# Patient Record
Sex: FEMALE | Race: WHITE | NOT HISPANIC OR LATINO | Employment: FULL TIME | URBAN - METROPOLITAN AREA
[De-identification: names, ages, dates, MRNs, and addresses within clinical notes are randomized per-mention and may not be internally consistent; named-entity substitution may affect disease eponyms.]

---

## 2021-01-06 ENCOUNTER — TELEPHONE (OUTPATIENT)
Dept: PSYCHIATRY | Facility: CLINIC | Age: 26
End: 2021-01-06

## 2021-01-06 NOTE — TELEPHONE ENCOUNTER
Behavorial Health Outpatient Intake Questions    Referred by: Insurance    Please advised interviewee that they need to answer all questions truthfully to allow for best care and any misrepresentations of information may affect their ability to be seen at this clinic   => Was this discussed? Yes     Behavorial Health Outpatient Intake History -     Presenting Problem (in patient's words): Patient is diagnosed with SIDDHARTH and Panic Disorder, she has seeing a psychiatrist since she was 7  She is currently taking Prozac, 40mg a day  Patient's Insurance changed and her current psychiatrist does not take it, she is looking to transition her care for medication management  Patient will run out of medication at the end of February  Are there any developmental disabilities? ? If yes, can they speak to you on the phone? If they are too limited to speak to you on phone, refer out No    Are you taking any psychiatric medications? Yes    => If yes, who prescribes? If yes, are they injectable medications? [facility] in [facility]     Does the patient have a language barrier or hearing impairment? No    Have you been treated at [facility] by a therapist or a doctor in the past? If yes, who? No    Has the patient been hospitalized for mental health? No   If yes, how long ago was last hospitalization and where was it? Do you actively use alcohol or marijuana or illegal substances? If yes, what and how much - refer out to Drug and alcohol treatment if use is excessive or daily use of illegal substances No concerns of substance abuse are reported  Do you have a community treatment team or ? No    Legal History-     Does the patient have any history of arrests, retirement/[facility] time, or DUIs? No  If Yes-  1) What types of charges? 2) When were they last incarcerated? 3) Are they currently on parole or probation? Minor Child-    Who has custody of the child? Is there a custody agreement?      If there is a custody agreement remind parent that they must bring a copy to the first appt or they will not be seen  Intake Team, please check with provider before scheduling if flags come up such as:  - complex case  - legal history (other than DUI)  - communication barrier concerns are present  - if, in your judgment, this needs further review    ACCEPTED as a patient Yes  => Appointment Date: Tuesday, 1/26 at 1:00pm with Maribel Katherin    Referred Elsewhere? No    Name of Insurance Co: [id number] ID# [id number]  Insurance Phone # [phone]   If ins is primary or secondary  If patient is a minor, parents information such as Name, D  O B of guarantor

## 2021-01-12 ENCOUNTER — TELEMEDICINE (OUTPATIENT)
Dept: PSYCHIATRY | Facility: CLINIC | Age: 26
End: 2021-01-12
Payer: COMMERCIAL

## 2021-01-12 DIAGNOSIS — F33.1 MAJOR DEPRESSIVE DISORDER, RECURRENT, MODERATE (HCC): ICD-10-CM

## 2021-01-12 DIAGNOSIS — F41.0 PANIC DISORDER: ICD-10-CM

## 2021-01-12 DIAGNOSIS — F41.1 GENERALIZED ANXIETY DISORDER: Primary | ICD-10-CM

## 2021-01-12 DIAGNOSIS — F40.298 SPECIFIC PHOBIA: ICD-10-CM

## 2021-01-12 PROCEDURE — 99244 OFF/OP CNSLTJ NEW/EST MOD 40: CPT | Performed by: PSYCHIATRY & NEUROLOGY

## 2021-01-12 RX ORDER — PAROXETINE HYDROCHLORIDE 40 MG/1
20 TABLET, FILM COATED ORAL EVERY MORNING
Qty: 30 TABLET | Refills: 1 | Status: SHIPPED | OUTPATIENT
Start: 2021-01-12 | End: 2021-02-23

## 2021-01-12 RX ORDER — HYDROXYZINE HYDROCHLORIDE 25 MG/1
TABLET, FILM COATED ORAL
COMMUNITY
Start: 2020-12-07 | End: 2021-01-12 | Stop reason: SDUPTHER

## 2021-01-12 RX ORDER — HYDROXYZINE HYDROCHLORIDE 25 MG/1
25 TABLET, FILM COATED ORAL EVERY 6 HOURS PRN
Qty: 120 TABLET | Refills: 1 | Status: SHIPPED | OUTPATIENT
Start: 2021-01-12 | End: 2021-05-25 | Stop reason: SDUPTHER

## 2021-01-12 RX ORDER — FLUOXETINE HYDROCHLORIDE 40 MG/1
40 CAPSULE ORAL DAILY
COMMUNITY
Start: 2020-12-21 | End: 2021-01-12

## 2021-01-12 RX ORDER — NORGESTREL AND ETHINYL ESTRADIOL 0.3-0.03MG
KIT ORAL
COMMUNITY
Start: 2020-11-20
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
## 2021-01-12 NOTE — PATIENT INSTRUCTIONS
Generalized anxiety disorder  -     PARoxetine (PAXIL) 40 MG tablet; Take 0 5 tablets (20 mg total) by mouth every morning For 2 weeks and then increase to 40mg    Major depressive disorder, recurrent, moderate (HCC)  -     PARoxetine (PAXIL) 40 MG tablet; Take 0 5 tablets (20 mg total) by mouth every morning For 2 weeks and then increase to 40mg    Panic disorder  -     hydrOXYzine HCL (ATARAX) 25 mg tablet; Take 1 tablet (25 mg total) by mouth every 6 (six) hours as needed for anxiety    Specific phobia  -     hydrOXYzine HCL (ATARAX) 25 mg tablet; Take 1 tablet (25 mg total) by mouth every 6 (six) hours as needed for anxiety    Other orders  -     Discontinue: FLUoxetine (PROzac) 40 MG capsule;  Take 40 mg by mouth daily  -     Discontinue: hydrOXYzine HCL (ATARAX) 25 mg tablet; TK 1 T PO Q 6 H PRN  -     Low-Ogestrel 0 3-30 MG-MCG per tablet; TK 1 T PO D        Follow up in 2 months  Continue with own therapist

## 2021-01-12 NOTE — BH TREATMENT PLAN
TREATMENT PLAN (Medication Management Only)        [facility]    Name and Date of Birth:  Ling Schaefer 26 y o  1995  Date of Treatment Plan: January 12, 2021  Diagnosis/Diagnoses:    1  Generalized anxiety disorder    2  Major depressive disorder, recurrent, moderate (HCC)    3  Panic disorder    4  Specific phobia      Strengths/Personal Resources for Self-Care: "I am very empathetic  I am good at reading people  I have a lot musical abilities  I like to sing  ", supportive family, taking medications as prescribed  Area/Areas of need (in own words): "focusing on me and making sure I am my best self before jumping into another relationship  be less impulsive "  1  Long Term Goal: improve acceptable anxiety level  Exercise more  Target Date:6 months - 7/12/2021  Person/Persons responsible for completion of goal: Katie Dela Cruz, Dr Guillermina Mosley  2  Short Term Objective (s) - How will we reach this goal?:   A  Provider new recommended medication/dosage changes and/or continue medication(s): Medication changes: I have discontinued Riya Hood's FLUoxetine  I have also changed her hydrOXYzine HCL  Additionally, I am having her start on PARoxetine  Lastly, I am having her maintain her Low-Ogestrel     B  N/A   C  N/A  Target Date:2 months - 7/12/21  Person/Persons Responsible for Completion of Goal: Dr Jac Argueta  Progress Towards Goals: starting treatment  Treatment Modality: medication management every 2 months, continue psychotherapy with own therapist  Review due 180 days from date of this plan: 6 months - 7/12/2021  Expected length of service: ongoing treatment  My Physician/PA/NP and I have developed this plan together and I agree to work on the goals and objectives  I understand the treatment goals that were developed for my treatment      Treatment Plan done but not signed at time of office visit due to:  Plan reviewed by phone or in person  and verbal consent given due to [address] distancing

## 2021-02-23 ENCOUNTER — OFFICE VISIT (OUTPATIENT)
Dept: PSYCHIATRY | Facility: CLINIC | Age: 26
End: 2021-02-23
Payer: COMMERCIAL

## 2021-02-23 DIAGNOSIS — F41.0 PANIC DISORDER: ICD-10-CM

## 2021-02-23 DIAGNOSIS — F33.1 MAJOR DEPRESSIVE DISORDER, RECURRENT, MODERATE (HCC): ICD-10-CM

## 2021-02-23 DIAGNOSIS — F40.298 SPECIFIC PHOBIA: ICD-10-CM

## 2021-02-23 DIAGNOSIS — F41.1 GENERALIZED ANXIETY DISORDER: Primary | ICD-10-CM

## 2021-02-23 PROCEDURE — 99213 OFFICE O/P EST LOW 20 MIN: CPT | Performed by: PSYCHIATRY & NEUROLOGY

## 2021-02-23 RX ORDER — FLUOXETINE HYDROCHLORIDE 40 MG/1
40 CAPSULE ORAL DAILY
Qty: 90 CAPSULE | Refills: 1 | Status: SHIPPED | OUTPATIENT
Start: 2021-02-23 | End: 2021-05-25

## 2021-02-23 RX ORDER — FLUOXETINE HYDROCHLORIDE 40 MG/1
40 CAPSULE ORAL DAILY
COMMUNITY
Start: 2021-01-20 | End: 2021-02-23 | Stop reason: SDUPTHER

## 2021-02-23 NOTE — PATIENT INSTRUCTIONS
Generalized anxiety disorder  -     FLUoxetine (PROzac) 40 MG capsule; Take 1 capsule (40 mg total) by mouth daily    Panic disorder  -     FLUoxetine (PROzac) 40 MG capsule; Take 1 capsule (40 mg total) by mouth daily    Specific phobia  -     FLUoxetine (PROzac) 40 MG capsule; Take 1 capsule (40 mg total) by mouth daily    Major depressive disorder, recurrent, moderate (HCC)  -     FLUoxetine (PROzac) 40 MG capsule; Take 1 capsule (40 mg total) by mouth daily    Other orders  -     Discontinue: FLUoxetine (PROzac) 40 MG capsule;  Take 40 mg by mouth daily        Continue atarax as needed    Follow up in 3 months  Continue therapy
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
## 2021-05-25 ENCOUNTER — OFFICE VISIT (OUTPATIENT)
Dept: PSYCHIATRY | Facility: CLINIC | Age: 26
End: 2021-05-25
Payer: COMMERCIAL

## 2021-05-25 DIAGNOSIS — F41.1 GENERALIZED ANXIETY DISORDER: Primary | ICD-10-CM

## 2021-05-25 DIAGNOSIS — F40.298 SPECIFIC PHOBIA: ICD-10-CM

## 2021-05-25 DIAGNOSIS — F41.0 PANIC DISORDER: ICD-10-CM

## 2021-05-25 DIAGNOSIS — F33.1 MAJOR DEPRESSIVE DISORDER, RECURRENT, MODERATE (HCC): ICD-10-CM

## 2021-05-25 PROCEDURE — 99214 OFFICE O/P EST MOD 30 MIN: CPT | Performed by: PSYCHIATRY & NEUROLOGY

## 2021-05-25 PROCEDURE — 90833 PSYTX W PT W E/M 30 MIN: CPT | Performed by: PSYCHIATRY & NEUROLOGY

## 2021-05-25 RX ORDER — HYDROXYZINE HYDROCHLORIDE 25 MG/1
25 TABLET, FILM COATED ORAL EVERY 6 HOURS PRN
Qty: 120 TABLET | Refills: 1 | Status: SHIPPED | OUTPATIENT
Start: 2021-05-25

## 2021-05-25 RX ORDER — FLUOXETINE HYDROCHLORIDE 60 MG/1
60 TABLET, FILM COATED ORAL; ORAL DAILY
Qty: 30 TABLET | Refills: 1 | Status: SHIPPED | OUTPATIENT
Start: 2021-05-25 | End: 2021-07-13 | Stop reason: SDUPTHER
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
## 2021-05-25 NOTE — PATIENT INSTRUCTIONS
Generalized anxiety disorder  -     FLUoxetine (PROzac) 60 MG TABS; Take 1 tablet (60 mg total) by mouth daily    Panic disorder  -     FLUoxetine (PROzac) 60 MG TABS; Take 1 tablet (60 mg total) by mouth daily  -     hydrOXYzine HCL (ATARAX) 25 mg tablet; Take 1 tablet (25 mg total) by mouth every 6 (six) hours as needed for anxiety    Specific phobia  -     FLUoxetine (PROzac) 60 MG TABS; Take 1 tablet (60 mg total) by mouth daily  -     hydrOXYzine HCL (ATARAX) 25 mg tablet; Take 1 tablet (25 mg total) by mouth every 6 (six) hours as needed for anxiety    Major depressive disorder, recurrent, moderate (HCC)  -     FLUoxetine (PROzac) 60 MG TABS; Take 1 tablet (60 mg total) by mouth daily        Continue atarax as needed    Follow up in 7 weeks  Continue therapy in private practice  Practice grounding self in panic attack

## 2021-07-13 ENCOUNTER — OFFICE VISIT (OUTPATIENT)
Dept: PSYCHIATRY | Facility: CLINIC | Age: 26
End: 2021-07-13
Payer: COMMERCIAL

## 2021-07-13 DIAGNOSIS — F41.0 PANIC DISORDER: ICD-10-CM

## 2021-07-13 DIAGNOSIS — F33.1 MAJOR DEPRESSIVE DISORDER, RECURRENT, MODERATE (HCC): ICD-10-CM

## 2021-07-13 DIAGNOSIS — F40.298 SPECIFIC PHOBIA: ICD-10-CM

## 2021-07-13 DIAGNOSIS — F41.1 GENERALIZED ANXIETY DISORDER: Primary | ICD-10-CM

## 2021-07-13 PROCEDURE — 99213 OFFICE O/P EST LOW 20 MIN: CPT | Performed by: PSYCHIATRY & NEUROLOGY

## 2021-07-13 RX ORDER — FLUOXETINE HYDROCHLORIDE 60 MG/1
60 TABLET, FILM COATED ORAL; ORAL DAILY
Qty: 90 TABLET | Refills: 1 | Status: SHIPPED | OUTPATIENT
Start: 2021-07-13 | End: 2022-02-01
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
## 2021-07-13 NOTE — BH TREATMENT PLAN
TREATMENT PLAN (Medication Management Only)        [employer]    Name and Date of Birth:  Luis Hylton 26 y o  1995  Date of Treatment Plan: July 13, 2021  Diagnosis/Diagnoses:    1  Generalized anxiety disorder    2  Panic disorder    3  Specific phobia    4  Major depressive disorder, recurrent, moderate (HCC)      Strengths/Personal Resources for Self-Care: "I am good at singing  I am a good friend and listener  I am a hard working  I am very empathetic  I am proactive and insightful "  Area/Areas of need (in own words): "keep practicing willful tolerance "  1  Long Term Goal: to be settled into a new job  being able to recognized first symptoms of panic attack and get that undercontrol     Target Date:6 months - 1/13/2022  Person/Persons responsible for completion of goal: Dr Beto Akins  2  Short Term Objective (s) - How will we reach this goal?:   A  Provider new recommended medication/dosage changes and/or continue medication(s): Medication changes: I am having Luis Hylton maintain her Low-Ogestrel, hydrOXYzine HCL, and FLUoxetine     B  N/A   C  N/A  Target Date:6 months - 1/13/2022  Person/Persons Responsible for Completion of Goal: Dr Beto Akins  Progress Towards Goals: continuing treatment  Treatment Modality: medication management every 6 months  Review due 180 days from date of this plan: 6 months - 1/13/2022  Expected length of service: ongoing treatment  My Physician/PA/NP and I have developed this plan together and I agree to work on the goals and objectives  I understand the treatment goals that were developed for my treatment    Treatment Plan done but not signed at time of office visit due to:  Plan reviewed by phone or in person  and verbal consent given due to Josh social nafisa

## 2021-07-13 NOTE — PATIENT INSTRUCTIONS
Generalized anxiety disorder    Panic disorder    Specific phobia    Major depressive disorder, recurrent, moderate (HCC)        Continue atarax as needed    Follow up in 6 months  Continue therapy in private practice  Practice grounding self in panic attack

## 2022-02-01 ENCOUNTER — OFFICE VISIT (OUTPATIENT)
Dept: PSYCHIATRY | Facility: CLINIC | Age: 27
End: 2022-02-01
Payer: COMMERCIAL

## 2022-02-01 DIAGNOSIS — F40.298 SPECIFIC PHOBIA: ICD-10-CM

## 2022-02-01 DIAGNOSIS — F33.1 MAJOR DEPRESSIVE DISORDER, RECURRENT, MODERATE (HCC): Primary | ICD-10-CM

## 2022-02-01 DIAGNOSIS — F41.0 PANIC DISORDER: ICD-10-CM

## 2022-02-01 DIAGNOSIS — F41.1 GENERALIZED ANXIETY DISORDER: ICD-10-CM

## 2022-02-01 PROCEDURE — 99214 OFFICE O/P EST MOD 30 MIN: CPT | Performed by: PSYCHIATRY & NEUROLOGY

## 2022-02-01 PROCEDURE — 90833 PSYTX W PT W E/M 30 MIN: CPT | Performed by: PSYCHIATRY & NEUROLOGY

## 2022-02-01 RX ORDER — FLUOXETINE 10 MG/1
10 CAPSULE ORAL DAILY
Qty: 14 CAPSULE | Refills: 0 | Status: SHIPPED | OUTPATIENT
Start: 2022-02-01 | End: 2022-07-07 | Stop reason: SDUPTHER

## 2022-02-01 RX ORDER — FLUOXETINE HYDROCHLORIDE 40 MG/1
40 CAPSULE ORAL DAILY
Qty: 90 CAPSULE | Refills: 2 | Status: SHIPPED | OUTPATIENT
Start: 2022-02-01 | End: 2022-07-07

## 2022-02-01 NOTE — PSYCH
Subjective:     Patient ID: Quan Barrios is a 23 y o  female with MDD, anxiety, and panic disorder being seen for a follow up  She is currently on Prozac (she did not want to change to paxil) and Atarax as needed  HPI ROS Appetite Changes and Sleep: the patient has been doing well  She has been feeling good on her medication and is compliant  She has been sleeping a lot recently  She believes this is due to the  Dose being too high  She has been sleeping for about 10 hours+ and feels this has happened when her dose has been too high  She has been feeling this sleepiness for a few months now  She also is having "cloudy headaches" which started recently, but has happened in the past with high doses of meds  She denied feeling depressed  She denied SI/HI  She denied other side effects, hospitalizations, or drug/alcohol use  She denied being pregnant  Appetite is good, no changes in weight       Review Of Systems:     Mood Depression and anxiety  controlled    Behavior Normal    Thought Content Normal   General Sleep Disturbances and lack of energy   Personality Normal   Other Psych Symptoms Normal   Constitutional Negative   ENT COVID early January   Cardiovascular Negative   Respiratory Negative   Gastrointestinal Negative   Genitourinary Negative   Musculoskeletal Negative   Integumentary Negative   Neurological As Noted in HPI   Endocrine Normal    Other Symptoms Normal              Laboratory Results: none to review    Substance Abuse History:  Social History     Substance and Sexual Activity   Drug Use Never       Family Psychiatric History:   Family History   Problem Relation Age of Onset    Personality disorder Sister     Anxiety disorder Maternal Aunt     Bipolar disorder Maternal Aunt     Depression Maternal Aunt     Drug abuse Maternal Aunt     Alcohol abuse Paternal Grandfather     Depression Paternal Grandfather     OCD Maternal Aunt        The following portions of the patient's history were reviewed and updated as appropriate: allergies, current medications, past family history, past medical history, past social history, past surgical history and problem list     Social History     Socioeconomic History    Marital status: Single     Spouse name: Not on file    Number of children: 0    Years of education: 12    Highest education level: Bachelor's degree (e g , BA, AB, BS)   Occupational History    Occupation:      Comment: [employer]   Tobacco Use    Smoking status: Never Smoker    Smokeless tobacco: Never Used   Vaping Use    Vaping Use: Never used   Substance and Sexual Activity    Alcohol use:  Yes    Drug use: Never    Sexual activity: Yes     Partners: Male     Birth control/protection: OCP   Other Topics Concern    Not on file   Social History Narrative    Not on file     Social Determinants of Health     Financial Resource Strain: Not on file   Food Insecurity: Not on file   Transportation Needs: Not on file   Physical Activity: Not on file   Stress: Not on file   Social Connections: Not on file   Intimate Partner Violence: Not on file   Housing Stability: Not on file     Social History     Social History Narrative    Not on file       Objective:       Mental status:  Appearance calm and cooperative , adequate hygiene and grooming and good eye contact    Mood euthymic   Affect affect was broad   Speech a normal rate   Thought Processes coherent/organized and normal thought processes   Hallucinations no hallucinations present    Thought Content no delusions   Abnormal Thoughts no suicidal thoughts  and no homicidal thoughts     Orientation  oriented to person and place and time   Remote Memory short term memory intact and long term memory intact   Attention Span concentration intact   Intellect Appears to be of Average Intelligence   Insight Insight intact   Judgement judgment was intact   Muscle Strength Muscle strength and tone were normal and Normal gait    Language no difficulty naming common objects   Fund of Knowledge displays adequate knowledge of current events and adequate fund of knowledge regarding past history   Pain none   Pain Scale 0       Assessment/Plan:       Diagnoses and all orders for this visit:    Major depressive disorder, recurrent, moderate (HCC)  -     FLUoxetine (PROzac) 40 MG capsule; Take 1 capsule (40 mg total) by mouth daily With 10mg together for 2 weeks, then decrease to 40mg  -     FLUoxetine (PROzac) 10 mg capsule; Take 1 capsule (10 mg total) by mouth daily With 40mg together for 2 weeks, then decrease to 40mg    Generalized anxiety disorder  -     FLUoxetine (PROzac) 10 mg capsule; Take 1 capsule (10 mg total) by mouth daily With 40mg together for 2 weeks, then decrease to 40mg    Panic disorder  -     FLUoxetine (PROzac) 10 mg capsule; Take 1 capsule (10 mg total) by mouth daily With 40mg together for 2 weeks, then decrease to 40mg    Specific phobia  -     FLUoxetine (PROzac) 10 mg capsule; Take 1 capsule (10 mg total) by mouth daily With 40mg together for 2 weeks, then decrease to 40mg        Continue atarax as needed  Use 10,000 light for 30 mins in the morning    Follow up in 6 weeks  Continue therapy in private practice  Practice grounding self in panic attack  Treatment Recommendations- Risks Benefits      Immediate Medical/Psychiatric/Psychotherapy Treatments and Any Precautions: She has not been depressed or anxious, but in the last three months has been feeling more sleepy and has trouble with energy  This may be medication related as this was the case in the past  Will taper down to 40mg of prozac as per above       Risks, Benefits And Possible Side Effects Of Medications:  {PSYCH RISK, BENEFITS AND POSSIBLE SIDE EFFECTS discussed    Controlled Medication Discussion: No records found for controlled prescriptions according to Francia Doherty 17         This note was not shared with the patient due to reasonable likelihood of causing patient harm

## 2022-02-01 NOTE — PATIENT INSTRUCTIONS
Major depressive disorder, recurrent, moderate (HCC)  -     FLUoxetine (PROzac) 40 MG capsule; Take 1 capsule (40 mg total) by mouth daily With 10mg together for 2 weeks, then decrease to 40mg  -     FLUoxetine (PROzac) 10 mg capsule; Take 1 capsule (10 mg total) by mouth daily With 40mg together for 2 weeks, then decrease to 40mg    Generalized anxiety disorder  -     FLUoxetine (PROzac) 10 mg capsule; Take 1 capsule (10 mg total) by mouth daily With 40mg together for 2 weeks, then decrease to 40mg    Panic disorder  -     FLUoxetine (PROzac) 10 mg capsule; Take 1 capsule (10 mg total) by mouth daily With 40mg together for 2 weeks, then decrease to 40mg    Specific phobia  -     FLUoxetine (PROzac) 10 mg capsule; Take 1 capsule (10 mg total) by mouth daily With 40mg together for 2 weeks, then decrease to 40mg        Continue atarax as needed  Use 10,000 light for 30 mins in the morning    Follow up in 6 weeks  Continue therapy in private practice  Practice grounding self in panic attack

## 2022-02-01 NOTE — BH TREATMENT PLAN
TREATMENT PLAN (Medication Management Only)        [facility]    Name and Date of Birth:  Charly Florez 23 y o  1995  Date of Treatment Plan: February 1, 2022  Diagnosis/Diagnoses:    1  Major depressive disorder, recurrent, moderate (HCC)    2  Generalized anxiety disorder    3  Panic disorder    4  Specific phobia      Strengths/Personal Resources for Self-Care: "Im good at singing, I am a good listener, and very empathetic "  Area/Areas of need (in own words): "internalizing minor things", sleep problems, lack of energy  1  Long Term Goal: 20 lbs weight loss   Target Date:6 months - 8/1/2022  Person/Persons responsible for completion of goal: Donovan Chauhan, Dr Rafiq Morel  2  Short Term Objective (s) - How will we reach this goal?:   A  Provider new recommended medication/dosage changes and/or continue medication(s): Medication changes: I have discontinued Riya Hood's FLUoxetine  I am also having her start on FLUoxetine and FLUoxetine  Additionally, I am having her maintain her Low-Ogestrel and hydrOXYzine HCL     B  N/A   C  N/A  Target Date:6 months - 8/1/2022  Person/Persons Responsible for Completion of Goal: Dr Rafiq Coulter  Progress Towards Goals: continuing treatment  Treatment Modality: medication management every 6 weeks, continue psychotherapy with own therapist  Review due 180 days from date of this plan: 6 months - 8/1/2022  Expected length of service: ongoing treatment  My Physician/PA/NP and I have developed this plan together and I agree to work on the goals and objectives  I understand the treatment goals that were developed for my treatment        Treatment Plan done but not signed at time of office visit due to:  Plan reviewed by phone or in person  and verbal consent given due to Gwyn Hernandez social nafisa

## 2022-04-05 ENCOUNTER — TELEMEDICINE (OUTPATIENT)
Dept: PSYCHIATRY | Facility: CLINIC | Age: 27
End: 2022-04-05
Payer: COMMERCIAL

## 2022-04-05 DIAGNOSIS — F33.1 MAJOR DEPRESSIVE DISORDER, RECURRENT, MODERATE (HCC): ICD-10-CM

## 2022-04-05 DIAGNOSIS — F40.298 SPECIFIC PHOBIA: ICD-10-CM

## 2022-04-05 DIAGNOSIS — F41.1 GENERALIZED ANXIETY DISORDER: Primary | ICD-10-CM

## 2022-04-05 DIAGNOSIS — F41.0 PANIC DISORDER: ICD-10-CM

## 2022-04-05 PROCEDURE — 99213 OFFICE O/P EST LOW 20 MIN: CPT | Performed by: PSYCHIATRY & NEUROLOGY

## 2022-04-05 NOTE — PATIENT INSTRUCTIONS
Diagnoses and all orders for this visit:    Generalized anxiety disorder    Panic disorder    Specific phobia    Major depressive disorder, recurrent, moderate (HCC)      continue Prozac 40mg daily  Continue atarax as needed  Use 10,000 light for 30 mins in the morning    Follow up in 3 month with Louise Monge  Continue therapy in private practice

## 2022-04-05 NOTE — PSYCH
Virtual Regular Visit    Verification of patient location:    Patient is located in the following state in which I hold an active license [place]      Assessment/Plan:    Problem List Items Addressed This Visit     None             Reason for visit is   Chief Complaint   Patient presents with    Medication Management    Virtual Regular Visit        Encounter provider Kelly Cam MD    Provider located at [facility] [id number]      Recent Visits  No visits were found meeting these conditions  Showing recent visits within past 7 days and meeting all other requirements  Today's Visits  Date Type Provider Dept   04/05/22 1660 S  MD David Momin 73 today's visits and meeting all other requirements  Future Appointments  No visits were found meeting these conditions  Showing future appointments within next 150 days and meeting all other requirements       The patient was identified by name and date of birth  Haleigh Penaloza was informed that this is a telemedicine visit and that the visit is being conducted throughEpic Embedded and patient was informed this is a secure, HIPAA-complaint platform  She agrees to proceed     My office door was closed  No one else was in the room  She acknowledged consent and understanding of privacy and security of the video platform  The patient has agreed to participate and understands they can discontinue the visit at any time  Patient is aware this is a billable service  Subjective:     Patient ID: Haleigh Penaloza is a 23 y o  female with MDD, anxiety, and panic disorder being seen for a follow up  She is currently on Prozac (she did not want to change to paxil) and Atarax as needed  HPI ROS Appetite Changes and Sleep: the patient has been doing well   Since going back to 40mg of Prozac from the 60mg she has been feeling a lot better and not tired  Endorsed a good mood  No anxiety that is unmanageable  She is sleeping well, a lot better  She is getting 8 hours now and before she was requiring 10 or more and still didn't feel fully rested  Work is busy and going well  Appetite is going well  No changes in her weight  She is trying to lose weight  She did just move to a different house, which was stressful  She has not taken the atarax in a long time  She denied SI/HI  She has been using the therapy light in the morning and it is helping her feel less tired and helps her wake up in the morning  Review Of Systems:     Mood Depression and anxiety  controlled    Behavior Normal    Thought Content Normal   General Normal     Personality Normal   Other Psych Symptoms Normal   Constitutional Negative   ENT Negative   Cardiovascular Negative   Respiratory Negative   Gastrointestinal Negative   Genitourinary Negative   Musculoskeletal Negative   Integumentary Negative   Neurological Negative   Endocrine Normal    Other Symptoms Normal              Laboratory Results: none to review    Substance Abuse History:  Social History     Substance and Sexual Activity   Drug Use Never       Family Psychiatric History:   Family History   Problem Relation Age of Onset    Personality disorder Sister     Anxiety disorder Maternal Aunt     Bipolar disorder Maternal Aunt     Depression Maternal Aunt     Drug abuse Maternal Aunt     Alcohol abuse Paternal Grandfather     Depression Paternal Grandfather     OCD Maternal Aunt        The following portions of the patient's history were reviewed and updated as appropriate: allergies, current medications, past family history, past medical history, past social history, past surgical history and problem list     Social History     Socioeconomic History    Marital status: Single     Spouse name: Not on file    Number of children: 0    Years of education: 12    Highest education level:  Bachelor's degree (e g , BA, AB, BS)   Occupational History    Occupation:      Comment: [employer]   Tobacco Use    Smoking status: Never Smoker    Smokeless tobacco: Never Used   Vaping Use    Vaping Use: Never used   Substance and Sexual Activity    Alcohol use:  Yes    Drug use: Never    Sexual activity: Yes     Partners: Male     Birth control/protection: OCP   Other Topics Concern    Not on file   Social History Narrative    Not on file     Social Determinants of Health     Financial Resource Strain: Not on file   Food Insecurity: Not on file   Transportation Needs: Not on file   Physical Activity: Not on file   Stress: Not on file   Social Connections: Not on file   Intimate Partner Violence: Not on file   Housing Stability: Not on file     Social History     Social History Narrative    Not on file       Objective:       Mental status:  Appearance calm and cooperative , adequate hygiene and grooming and good eye contact    Mood euthymic   Affect affect was broad   Speech a normal rate   Thought Processes coherent/organized and normal thought processes   Hallucinations no hallucinations present    Thought Content no delusions   Abnormal Thoughts no suicidal thoughts  and no homicidal thoughts     Orientation  oriented to person and place and time   Remote Memory short term memory intact and long term memory intact   Attention Span concentration intact   Intellect Appears to be of Average Intelligence   Insight Insight intact   Judgement judgment was intact   Muscle Strength Muscle strength and tone were normal   Language no difficulty naming common objects   Fund of Knowledge displays adequate knowledge of current events and adequate fund of knowledge regarding past history   Pain none   Pain Scale 0       Assessment/Plan:       Diagnoses and all orders for this visit:    Generalized anxiety disorder    Panic disorder    Specific phobia    Major depressive disorder, recurrent, moderate (HCC)      continue Prozac 40mg daily  Continue atarax as needed  Use 10,000 light for 30 mins in the morning    Follow up in 3 month with Manju Moreland  Continue therapy in private practice        Treatment Recommendations- Risks Benefits      Immediate Medical/Psychiatric/Psychotherapy Treatments and Any Precautions: she has been a lot better on the 40mg of prozac, a lot less tired and sleeps better  She is also no longer needing to take atarax as her anxiety is controlled  Using the therapy light int he morning has been very helpful with her energy as well  Risks, Benefits And Possible Side Effects Of Medications:  {PSYCH RISK, BENEFITS AND POSSIBLE SIDE EFFECTS discussed    Controlled Medication Discussion: No records found for controlled prescriptions according to Francia Doherty 17         This note was not shared with the patient due to reasonable likelihood of causing patient harm  I spent 15 minutes with patient today in which greater than 50% of the time was spent in counseling/coordination of care regarding treatment    VIRTUAL VISIT DISCLAIMER    Shante De La Cruz verbally agrees to participate in [employer]  Pt is aware that [employer] could be limited without vital signs or the ability to perform a full hands-on physical Amarafloyd Comer understands she or the provider may request at any time to terminate the video visit and request the patient to seek care or treatment in person

## 2022-07-07 ENCOUNTER — OFFICE VISIT (OUTPATIENT)
Dept: PSYCHIATRY | Facility: CLINIC | Age: 27
End: 2022-07-07
Payer: COMMERCIAL

## 2022-07-07 DIAGNOSIS — F41.0 PANIC DISORDER: ICD-10-CM

## 2022-07-07 DIAGNOSIS — F40.298 SPECIFIC PHOBIA: ICD-10-CM

## 2022-07-07 DIAGNOSIS — F41.1 GENERALIZED ANXIETY DISORDER: Primary | ICD-10-CM

## 2022-07-07 DIAGNOSIS — F33.1 MAJOR DEPRESSIVE DISORDER, RECURRENT, MODERATE (HCC): ICD-10-CM

## 2022-07-07 PROCEDURE — 99214 OFFICE O/P EST MOD 30 MIN: CPT | Performed by: PHYSICIAN ASSISTANT

## 2022-07-07 RX ORDER — FLUOXETINE 10 MG/1
CAPSULE ORAL
Qty: 270 CAPSULE | Refills: 1 | Status: SHIPPED | OUTPATIENT
Start: 2022-07-07

## 2022-07-07 NOTE — PSYCH
PROGRESS NOTE        [address] [facility]      Name and Date of Birth:  Quan Barrios 23 y o  1995    Date of Visit: 07/07/22    SUBJECTIVE:  Richard Hicks was seen for follow-up of generalized anxiety, panic disorder, phobia and major depression  Overall states that she has been doing very well with her moods over the past few months  No significant depressive episodes  No crying spells or tearfulness  Anxiety has been manageable  She has not had any panic attacks  States her past phobia of vomiting has not been as disruptive  States that she had COVID a few months ago and had an episode of vomiting and the exposure seem to decrease this  Reports that she is sleeping well at night  States that she is feeling more lethargic though again and has been sleeping almost 10 hours  Reports that her appetite has been adequate  She has been trying to follow a healthier diet due to weight gain over the past few months  States that she has been trying to get out for walks and do some more exercise  States that she is not an exercise person" and has social anxiety regarding going to the gym or taking a class  Will continue to try to increase physical activity at home  Works full-time as an  a [employer] which she states is going well  Physically reports that she is feeling well  No new medications are medical issues  Medication list reviewed and updated  States that she typically uses her light box in the fall and winter months since that is when her depression increases  She denies suicidal ideation, intent or plan at present, has no suicidal ideation, intent or plan at present  She denies any auditory hallucinations and visual hallucinations, denies any other delusional thinking, denies any delusional thinking  Side effect of somnolence as noted above with medication      HPI ROS Appetite Changes and Sleep: normal appetite, increased sleep    Review Of Systems:      Constitutional Negative   ENT Negative   Cardiovascular Negative   Respiratory Negative   Gastrointestinal Negative   Genitourinary Negative   Musculoskeletal Negative   Integumentary Negative   Neurological Negative   Endocrine Negative   Other Symptoms Negative and None       Laboratory Results: No results found for this or any previous visit  Substance Abuse History:    Social History     Substance and Sexual Activity   Drug Use Never       Family Psychiatric History:     Family History   Problem Relation Age of Onset    Personality disorder Sister     Anxiety disorder Maternal Aunt     Bipolar disorder Maternal Aunt     Depression Maternal Aunt     Drug abuse Maternal Aunt     Alcohol abuse Paternal Grandfather     Depression Paternal Grandfather     OCD Maternal Aunt        The following portions of the patient's history were reviewed and updated as appropriate: past family history, past medical history, past social history, past surgical history and problem list     Social History     Socioeconomic History    Marital status: Single     Spouse name: Not on file    Number of children: 0    Years of education: 12    Highest education level: Bachelor's degree (e g , BA, AB, BS)   Occupational History    Occupation:      Comment: [employer]   Tobacco Use    Smoking status: Never Smoker    Smokeless tobacco: Never Used   Vaping Use    Vaping Use: Never used   Substance and Sexual Activity    Alcohol use:  Yes    Drug use: Never    Sexual activity: Yes     Partners: Male     Birth control/protection: OCP   Other Topics Concern    Not on file   Social History Narrative    Not on file     Social Determinants of Health     Financial Resource Strain: Not on file   Food Insecurity: Not on file   Transportation Needs: Not on file   Physical Activity: Not on file   Stress: Not on file   Social Connections: Not on file   Intimate Partner Violence: Not on file   Housing Stability: Not on file     Social History     Social History Narrative    Not on file        Social History     Tobacco History     Smoking Status  Never Smoker    Smokeless Tobacco Use  Never Used          Alcohol History     Alcohol Use Status  Yes          Drug Use     Drug Use Status  Never          Sexual Activity     Sexually Active  Yes Partners  Male Birth Control/Protection  OCP          Activities of Daily Living    Not Asked               Additional Substance Use Detail     Questions Responses    Cannabis frequency Never used    Comment: Never used on 1/12/2021     Cocaine frequency Never used    Comment: Never used on 1/12/2021     Amphetamine frequency Never used    Comment: Never used on 1/12/2021     Inhalant frequency Never used    Comment: Never used on 1/12/2021     Hallucinogen frequency Never used    Comment: Never used on 1/12/2021     Ecstasy frequency Never used    Comment: Never used on 1/12/2021     Other drug frequency Never used    Comment: Never used on 1/12/2021     Not reviewed              OBJECTIVE:     Mental Status Evaluation:    Appearance age appropriate, casually dressed   Behavior pleasant, cooperative   Speech normal volume, normal pitch   Mood Euthymic   Affect Appropriate   Thought Processes logical   Associations intact associations   Thought Content normal   Perceptual Disturbances: none   Abnormal Thoughts  Risk Potential Suicidal ideation - None  Homicidal ideation - None  Potential for aggression - No   Orientation oriented to person, place, time/date and situation   Memory recent and remote memory grossly intact   Cosciousness alert and awake   Attention Span attention span and concentration are age appropriate   Intellect Not formally assessed   Insight age appropriate    Judgement good    Muscle Strength and  Gait Steady gait   Language no difficulty naming common objects   Fund of Knowledge displays adequate knowledge of current events   Pain none Pain Scale 0       Assessment/Plan:       Diagnoses and all orders for this visit:    Generalized anxiety disorder  -     FLUoxetine (PROzac) 10 mg capsule; 3 capsules po daily    Panic disorder  -     FLUoxetine (PROzac) 10 mg capsule; 3 capsules po daily    Major depressive disorder, recurrent, moderate (HCC)  -     FLUoxetine (PROzac) 10 mg capsule; 3 capsules po daily    Specific phobia  -     FLUoxetine (PROzac) 10 mg capsule; 3 capsules po daily          Treatment Recommendations/Precautions:  Fluoxetine 40 mg daily, will reduce to 30 mg total per day due to somnolence and monitor  Hydroxyzine p r n  , rarely taking  Light therapy 97934 units 30minutes the morning, uses in the fall and winter  Follow-up in three months and she will call me sooner if any questions or concerns      Risks/Benefits      Risks, Benefits And Possible Side Effects Of Medications:    Risks, benefits, and possible side effects of medications explained to patient and patient verbalizes understanding and agreement for treatment      Controlled Medication Discussion:     Not applicable    Psychotherapy Provided:     Individual psychotherapy provided: No

## 2023-01-07 ENCOUNTER — OFFICE VISIT (OUTPATIENT)
Dept: URGENT CARE | Facility: CLINIC | Age: 28
End: 2023-01-07

## 2023-01-07 VITALS
TEMPERATURE: 97.9 F | BODY MASS INDEX: 32.14 KG/M2 | HEIGHT: 66 IN | OXYGEN SATURATION: 98 % | RESPIRATION RATE: 16 BRPM | HEART RATE: 120 BPM | WEIGHT: 200 LBS

## 2023-01-07 DIAGNOSIS — S60.459A: Primary | ICD-10-CM

## 2023-01-07 DIAGNOSIS — L03.113 CELLULITIS OF RIGHT HAND: ICD-10-CM

## 2023-01-07 RX ORDER — DOXYCYCLINE 100 MG/1
100 TABLET ORAL 2 TIMES DAILY
Qty: 14 TABLET | Refills: 0 | Status: SHIPPED | OUTPATIENT
Start: 2023-01-07 | End: 2023-01-14

## 2023-01-07 NOTE — PROGRESS NOTES
[facility] [facility]        NAME: Rashard Mendiola is a 33 y o  female  : 1995    MRN: [id number]  DATE: 2023  TIME: 10:40 AM    Assessment and Plan   Foreign body in skin of finger of right hand [L67 [id number]]  1  Foreign body in skin of finger of right hand        2  Cellulitis of right hand        Patient has a foreign body underneath the nail of the right small finger  It appears to be in several pieces at this time  She will be started on antibiotics to treat I have recommended doxycycline as this covers for many pathogens including common pathogens associated with splinters  She is instructed to perform Betadine soaks for the next 3 days and then switch to Epsom salt soaks until the splinter breaks down or is digested by her body  She is instructed to report to the emergency room if symptoms or not improving in the next 24 to 48 hours or sooner if symptoms worsen  Patient Instructions   There are no Patient Instructions on file for this visit  Follow up with PCP in 3-5 days  Proceed to  ER if symptoms worsen  Chief Complaint     Chief Complaint   Patient presents with   • Foreign Body in Skin     Pt has a piece of wood stuck under her right 5th nail bed  There is an increase in swelling and now she has redness going up her right forearm         History of Present Illness       27-year-old female presents with complaint of foreign body splinter underneath the nail of her right small finger  Patient additionally has noticed redness and erythema of the finger and a red streak on the volar aspect of her forearm that have developed in the last 36 hours since she got the splinter  Patient did try to remove the splinter on her own and was unsuccessful  Denies any fevers or chills, dizziness, or lightheadedness as well as nausea vomiting and diarrhea  Review of Systems   Review of Systems   Constitutional: Negative for chills and fever  Skin: Positive for color change and rash  Current Medications       Current Outpatient Medications:   •  FLUoxetine (PROzac) 10 mg capsule, 3 capsules po daily, Disp: 270 capsule, Rfl: 1  •  hydrOXYzine HCL (ATARAX) 25 mg tablet, Take 1 tablet (25 mg total) by mouth every 6 (six) hours as needed for anxiety, Disp: 120 tablet, Rfl: 1  •  Low-Ogestrel 0 3-30 MG-MCG per tablet, TK 1 T PO D, Disp: , Rfl:     Current Allergies     Allergies as of 01/07/2023 - Reviewed 01/07/2023   Allergen Reaction Noted   • Amoxicillin-pot clavulanate Hives 07/02/2020   • Penicillins Rash 12/17/2019            The following portions of the patient's history were reviewed and updated as appropriate: allergies, current medications, past family history, past medical history, past social history, past surgical history and problem list      Past Medical History:   Diagnosis Date   • Anxiety    • Depression    • Panic disorder        History reviewed  No pertinent surgical history  Family History   Problem Relation Age of Onset   • Personality disorder Sister    • Anxiety disorder Maternal Aunt    • Bipolar disorder Maternal Aunt    • Depression Maternal Aunt    • Drug abuse Maternal Aunt    • Alcohol abuse Paternal Grandfather    • Depression Paternal Grandfather    • OCD Maternal Aunt          Medications have been verified  Objective   Pulse (!) 120   Temp 97 9 °F (36 6 °C)   Resp 16   Ht 5' 6" (1 676 m)   Wt 90 7 kg (200 lb)   SpO2 98%   BMI 32 28 kg/m²   No LMP recorded  Physical Exam     Physical Exam  Constitutional:       General: She is awake  She is not in acute distress  Appearance: Normal appearance  She is well-developed and well-groomed  She is not ill-appearing, toxic-appearing or diaphoretic  HENT:      Head: Normocephalic and atraumatic  Right Ear: Hearing and external ear normal       Left Ear: Hearing and external ear normal    Eyes:      General: Lids are normal  Vision grossly intact  Gaze aligned appropriately  Cardiovascular:      Rate and Rhythm: Normal rate  Pulmonary:      Effort: Pulmonary effort is normal       Comments: Patient is speaking in full sentences with no increased respiratory effort  No audible wheezing or stridor  Musculoskeletal:      Cervical back: Normal range of motion  Comments: She has a small foreign wooden body below the nail on the right small finger  There is erythema and tenderness over the lateral aspect where the splinter is located  There is some mild erythema traveling up into the hand  Patient has a small streak on the volar forearm consistent with cellulitis secondary to foreign body  Skin:     General: Skin is warm and dry  Neurological:      Mental Status: She is alert and oriented to person, place, and time  Coordination: Coordination is intact  Gait: Gait is intact  Psychiatric:         Attention and Perception: Attention and perception normal          Mood and Affect: Mood and affect normal          Speech: Speech normal          Behavior: Behavior normal  Behavior is cooperative  Note: Portions of this record may have been created with voice recognition software  Occasional wrong word or "sound a like" substitutions may have occurred due to the inherent limitations of voice recognition software  Please read the chart carefully and recognize, using context, where substitutions have occurred  *

## 2023-01-07 NOTE — PATIENT INSTRUCTIONS
Take antibiotic as directed  Iodine:water 1:2 soaks for 15 minutes 3 times daily x 3 days  If symptoms do not improve in 24-48 hours, report to the emergency department  If symptoms worsen, report to the emergency department sooner

## 2024-02-19 ENCOUNTER — OFFICE VISIT (OUTPATIENT)
Dept: URGENT CARE | Facility: CLINIC | Age: 29
End: 2024-02-19
Payer: COMMERCIAL

## 2024-02-19 VITALS
HEART RATE: 106 BPM | TEMPERATURE: 99.1 F | SYSTOLIC BLOOD PRESSURE: 155 MMHG | DIASTOLIC BLOOD PRESSURE: 96 MMHG | RESPIRATION RATE: 18 BRPM | OXYGEN SATURATION: 96 %

## 2024-02-19 DIAGNOSIS — J01.00 ACUTE NON-RECURRENT MAXILLARY SINUSITIS: Primary | ICD-10-CM

## 2024-02-19 PROCEDURE — 99213 OFFICE O/P EST LOW 20 MIN: CPT | Performed by: PHYSICIAN ASSISTANT

## 2024-02-19 RX ORDER — DOXYCYCLINE HYCLATE 100 MG/1
100 CAPSULE ORAL EVERY 12 HOURS SCHEDULED
Qty: 14 CAPSULE | Refills: 0 | Status: SHIPPED | OUTPATIENT
Start: 2024-02-19 | End: 2024-02-26

## 2024-02-19 RX ORDER — PREDNISONE 50 MG/1
50 TABLET ORAL DAILY
Qty: 5 TABLET | Refills: 0 | Status: SHIPPED | OUTPATIENT
Start: 2024-02-19 | End: 2024-02-24

## 2024-02-19 RX ORDER — DOXYCYCLINE 100 MG/1
100 CAPSULE ORAL 2 TIMES DAILY
Qty: 14 CAPSULE | Refills: 0 | Status: SHIPPED | OUTPATIENT
Start: 2024-02-19 | End: 2024-02-26

## 2024-02-19 RX ORDER — LAMOTRIGINE 25 MG/1
TABLET ORAL
COMMUNITY
Start: 2023-08-14

## 2024-02-19 NOTE — PATIENT INSTRUCTIONS
Take antibiotic as directed until completed.  Take prednisone as directed until completed  Motrin and/or tylenol as need for fever or pain  Follow up with PCP in 3-5 days.  Proceed to  ER if symptoms worsen.    Sinusitis   AMBULATORY CARE:   Sinusitis  is inflammation or infection of your sinuses. Sinusitis is most often caused by a virus. Acute sinusitis may last up to 12 weeks. Chronic sinusitis lasts longer than 12 weeks. Recurrent sinusitis means you have 4 or more infections in 1 year.        Common signs and symptoms:   Fever    Pain, pressure, redness, or swelling around the forehead, cheeks, or eyes    Thick yellow or green discharge from your nose    Tenderness when you touch your face over your sinuses    Dry cough that happens mostly at night or when you lie down    Headache and face pain that is worse when you lean forward    Tooth pain, or pain when you chew    Seek care immediately if:   You have trouble breathing or wheezing that is getting worse.    You have a stiff neck, a fever, or a bad headache.     You cannot open your eye.     Your eyeball bulges out or you cannot move your eye.     You are more sleepy than normal, or you notice changes in your ability to think, move, or talk.    You have swelling of your forehead or scalp.    Call your doctor if:   You have vision changes, such as double vision.    Your eye and eyelid are red, swollen, and painful.     Your symptoms do not improve or go away after 10 days.    You have nausea and are vomiting.    Your nose is bleeding.    You have questions or concerns about your condition or care.    Medicines:  Your symptoms may go away on their own. Your healthcare provider may recommend watchful waiting for up to 10 days before starting antibiotics. You may need any of the following:  Acetaminophen  decreases pain and fever. It is available without a doctor's order. Ask how much to take and how often to take it. Follow directions. Read the labels of all  other medicines you are using to see if they also contain acetaminophen, or ask your doctor or pharmacist. Acetaminophen can cause liver damage if not taken correctly.    NSAIDs , such as ibuprofen, help decrease swelling, pain, and fever. This medicine is available with or without a doctor's order. NSAIDs can cause stomach bleeding or kidney problems in certain people. If you take blood thinner medicine, always ask your healthcare provider if NSAIDs are safe for you. Always read the medicine label and follow directions.    Nasal steroid sprays  may help decrease inflammation in your nose and sinuses.    Decongestants  help reduce swelling and drain mucus in the nose and sinuses. They may help you breathe easier.     Antihistamines  help dry mucus in the nose and relieve sneezing.     Antibiotics  help treat or prevent a bacterial infection.    Self-care:   Rinse your sinuses as directed.  Use a sinus rinse device to rinse your nasal passages with a saline (salt water) solution or distilled water. Do not use tap water. This will help thin the mucus in your nose and rinse away pollen and dirt. It will also help reduce swelling so you can breathe normally.    Use a humidifier  to increase air moisture in your home. This may make it easier for you to breathe and help decrease your cough.     Sleep with your head elevated.  Place an extra pillow under your head before you go to sleep to help your sinuses drain.     Drink liquids as directed.  Ask your healthcare provider how much liquid to drink each day and which liquids are best for you. Liquids will thin the mucus in your nose and help it drain. Avoid drinks that contain alcohol or caffeine.     Do not smoke, and avoid secondhand smoke.  Nicotine and other chemicals in cigarettes and cigars can make your symptoms worse. Ask your healthcare provider for information if you currently smoke and need help to quit. E-cigarettes or smokeless tobacco still contain nicotine.  Talk to your healthcare provider before you use these products.    Prevent the spread of germs:   Wash your hands often with soap and water.  Wash your hands after you use the bathroom, change a child's diaper, or sneeze. Wash your hands before you prepare or eat food.         Stay away from people who are sick.  Some germs spread easily and quickly through contact.    Follow up with your doctor as directed:  You may be referred to an ear, nose, and throat specialist. Write down your questions so you remember to ask them during your visits.   © Copyright [employer] 2023 Information is for End User's use only and may not be sold, redistributed or otherwise used for commercial purposes.  The above information is an  only. It is not intended as medical advice for individual conditions or treatments. Talk to your doctor, nurse or pharmacist before following any medical regimen to see if it is safe and effective for you.

## 2024-02-19 NOTE — PROGRESS NOTES
[facility]        NAME: Riya Hood is a 28 y.o. female  : 1995    MRN: [id number]  DATE: 2024  TIME: 12:55 PM    Assessment and Plan   Acute non-recurrent maxillary sinusitis [J01.00]  1. Acute non-recurrent maxillary sinusitis  doxycycline monohydrate (MONODOX) 100 mg capsule    predniSONE 50 mg tablet            Patient Instructions     Take antibiotic as directed until completed.  Take prednisone as directed until completed  Motrin and/or tylenol as need for fever or pain  Follow up with PCP in 3-5 days.  Proceed to  ER if symptoms worsen.    Chief Complaint     Chief Complaint   Patient presents with    sinus infection     X4 weeks, cough green/yellow mucous, congestion, sore throat, hoarse voice          History of Present Illness       28-year-old female presents with 4-week history of sinus pain pressure congestion cough.  Symptoms have been waxing waning for the past 4 weeks.  Has been trying over-the-counter remedies without any relief.  Denies any fevers or chills.  Denies any ear pain.  Has mild sore throat from time to time.  Productive cough intermittently.  Denies any chest pain shortness of breath.  No abdominal pain nausea vomiting or diarrhea.    Sinusitis  This is a new problem. The current episode started 1 to 4 weeks ago. The problem has been waxing and waning since onset. There has been no fever. The fever has been present for Less than 1 day. The pain is moderate. Associated symptoms include congestion, coughing, sinus pressure and a sore throat. Past treatments include sitting up, lying down and nasal decongestants. The treatment provided no relief.       Review of Systems   Review of Systems   Constitutional: Negative.    HENT:  Positive for congestion, postnasal drip, rhinorrhea, sinus pressure, sinus pain and sore throat.    Eyes: Negative.    Respiratory:  Positive for cough.    Cardiovascular: Negative.    Gastrointestinal: Negative.     Musculoskeletal: Negative.    Skin: Negative.    Neurological: Negative.          Current Medications       Current Outpatient Medications:     doxycycline monohydrate (MONODOX) 100 mg capsule, Take 1 capsule (100 mg total) by mouth 2 (two) times a day for 7 days, Disp: 14 capsule, Rfl: 0    FLUoxetine (PROzac) 10 mg capsule, 3 capsules po daily (Patient taking differently: States taking 40mg), Disp: 270 capsule, Rfl: 1    hydrOXYzine HCL (ATARAX) 25 mg tablet, Take 1 tablet (25 mg total) by mouth every 6 (six) hours as needed for anxiety, Disp: 120 tablet, Rfl: 1    lamoTRIgine (LaMICtal) 25 mg tablet, , Disp: , Rfl:     Low-Ogestrel 0.3-30 MG-MCG per tablet, TK 1 T PO D, Disp: , Rfl:     predniSONE 50 mg tablet, Take 1 tablet (50 mg total) by mouth daily for 5 days, Disp: 5 tablet, Rfl: 0    Current Allergies     Allergies as of 02/19/2024 - Reviewed 02/19/2024   Allergen Reaction Noted    Amoxicillin-pot clavulanate Hives 07/02/2020    Penicillins Rash 12/17/2019            The following portions of the patient's history were reviewed and updated as appropriate: allergies, current medications, past family history, past medical history, past social history, past surgical history and problem list.     Past Medical History:   Diagnosis Date    Anxiety     Depression     Panic disorder        History reviewed. No pertinent surgical history.    Family History   Problem Relation Age of Onset    Personality disorder Sister     Anxiety disorder Maternal Aunt     Bipolar disorder Maternal Aunt     Depression Maternal Aunt     Drug abuse Maternal Aunt     Alcohol abuse Paternal Grandfather     Depression Paternal Grandfather     OCD Maternal Aunt          Medications have been verified.        Objective   /96   Pulse (!) 106   Temp 99.1 °F (37.3 °C)   Resp 18   SpO2 96%   No LMP recorded.       Physical Exam     Physical Exam  Vitals and nursing note reviewed.   Constitutional:       General: She is not in  acute distress.     Appearance: Normal appearance. She is well-developed.   HENT:      Head: Normocephalic and atraumatic.      Right Ear: Hearing, tympanic membrane, ear canal and external ear normal. There is no impacted cerumen.      Left Ear: Hearing, tympanic membrane, ear canal and external ear normal. There is no impacted cerumen.      Nose: Congestion and rhinorrhea present.      Right Sinus: Maxillary sinus tenderness and frontal sinus tenderness present.      Left Sinus: Maxillary sinus tenderness and frontal sinus tenderness present.      Mouth/Throat:      Pharynx: Uvula midline. No oropharyngeal exudate.   Eyes:      General:         Right eye: No discharge.         Left eye: No discharge.      Conjunctiva/sclera: Conjunctivae normal.   Cardiovascular:      Rate and Rhythm: Normal rate and regular rhythm.      Heart sounds: Normal heart sounds. No murmur heard.  Pulmonary:      Effort: Pulmonary effort is normal. No respiratory distress.      Breath sounds: Normal breath sounds. No wheezing or rales.   Abdominal:      General: Bowel sounds are normal.      Palpations: Abdomen is soft.      Tenderness: There is no abdominal tenderness.   Musculoskeletal:         General: Normal range of motion.      Cervical back: Normal range of motion and neck supple.   Lymphadenopathy:      Cervical: No cervical adenopathy.   Skin:     General: Skin is warm and dry.   Neurological:      Mental Status: She is alert and oriented to person, place, and time.   Psychiatric:         Mood and Affect: Mood normal.

## 2024-06-28 ENCOUNTER — APPOINTMENT (OUTPATIENT)
Dept: URGENT CARE | Facility: CLINIC | Age: 29
End: 2024-06-28

## 2024-10-30 ENCOUNTER — TELEPHONE (OUTPATIENT)
Dept: FAMILY MEDICINE CLINIC | Facility: CLINIC | Age: 29
End: 2024-10-30

## 2024-11-13 ENCOUNTER — OFFICE VISIT (OUTPATIENT)
Dept: FAMILY MEDICINE CLINIC | Facility: CLINIC | Age: 29
End: 2024-11-13
Payer: COMMERCIAL

## 2024-11-13 ENCOUNTER — TELEPHONE (OUTPATIENT)
Dept: ADMINISTRATIVE | Facility: OTHER | Age: 29
End: 2024-11-13

## 2024-11-13 VITALS
WEIGHT: 211.8 LBS | DIASTOLIC BLOOD PRESSURE: 80 MMHG | RESPIRATION RATE: 18 BRPM | HEIGHT: 66 IN | TEMPERATURE: 99.1 F | HEART RATE: 99 BPM | SYSTOLIC BLOOD PRESSURE: 132 MMHG | OXYGEN SATURATION: 99 % | BODY MASS INDEX: 34.04 KG/M2

## 2024-11-13 DIAGNOSIS — E28.2 PCOS (POLYCYSTIC OVARIAN SYNDROME): ICD-10-CM

## 2024-11-13 DIAGNOSIS — K21.9 GASTROESOPHAGEAL REFLUX DISEASE, UNSPECIFIED WHETHER ESOPHAGITIS PRESENT: ICD-10-CM

## 2024-11-13 DIAGNOSIS — Z11.59 NEED FOR HEPATITIS C SCREENING TEST: ICD-10-CM

## 2024-11-13 DIAGNOSIS — Z76.89 ENCOUNTER TO ESTABLISH CARE: Primary | ICD-10-CM

## 2024-11-13 DIAGNOSIS — Z13.220 SCREENING CHOLESTEROL LEVEL: ICD-10-CM

## 2024-11-13 DIAGNOSIS — R53.83 OTHER FATIGUE: ICD-10-CM

## 2024-11-13 DIAGNOSIS — Z11.4 SCREENING FOR HIV (HUMAN IMMUNODEFICIENCY VIRUS): ICD-10-CM

## 2024-11-13 DIAGNOSIS — E66.811 CLASS 1 OBESITY WITHOUT SERIOUS COMORBIDITY WITH BODY MASS INDEX (BMI) OF 34.0 TO 34.9 IN ADULT, UNSPECIFIED OBESITY TYPE: ICD-10-CM

## 2024-11-13 DIAGNOSIS — Z23 ENCOUNTER FOR IMMUNIZATION: ICD-10-CM

## 2024-11-13 PROCEDURE — 90656 IIV3 VACC NO PRSV 0.5 ML IM: CPT

## 2024-11-13 PROCEDURE — 90471 IMMUNIZATION ADMIN: CPT

## 2024-11-13 PROCEDURE — 99203 OFFICE O/P NEW LOW 30 MIN: CPT | Performed by: NURSE PRACTITIONER

## 2024-11-13 RX ORDER — FAMOTIDINE 40 MG/1
40 TABLET, FILM COATED ORAL
Qty: 90 TABLET | Refills: 0 | Status: SHIPPED | OUTPATIENT
Start: 2024-11-13 | End: 2025-11-08

## 2024-11-13 NOTE — TELEPHONE ENCOUNTER
Upon review of the In Basket request we were able to locate, review, and update the patient chart as requested for Pap Smear (HPV) aka Cervical Cancer Screening.    Any additional questions or concerns should be emailed to the Practice Liaisons via the appropriate education email address, please do not reply via In Basket.    Thank you  Faith Kat MA   [facility]

## 2024-11-13 NOTE — PROGRESS NOTES
Ambulatory Visit  Name: Riya Hood      : 1995      MRN: [id number]  Encounter Provider: JOMAR Butler  Encounter Date: 2024   Encounter department: [facility]    Assessment & Plan  Encounter to establish care  Previous PCP was with [facility] about 2 years ago was last visit.   Works as a , works from home.   She is in a relationship, lives with her boyfriend.   No smoking, ETOH- 1 glass of wine daily. No recreational drug use.   Follows with telepsych for depression, anxiety and panic disorder          Gastroesophageal reflux disease, unspecified whether esophagitis present  Ongoing for >2 years. Started when she was placed on Prozac which works well for her depression and anxiety.   States it is most days but intermittent through out the day. Feeling burning in the epigastric up into the throat with frequent regurgitation.   B- eggs in the morning. L-salad or sand- lettuce, feta cheese chicken veggies- oil and ving. Turkey cheese bautista D- meat and two veggies. Caffeine- cut out   1 glass of wine at night. Water 3 large bottles of water.  Tries not to eat for 3-4 hours before bed.   -counseled on lifestyle modification.  -eliminate alcohol.   - start pepcid daily as instructed.   - counseled on diet and food to eliminate for reflux over the next month .   - does admit to over eating as she always feels hungry so limit portions as discussed.     Orders:    famotidine (PEPCID) 40 MG tablet; Take 1 tablet (40 mg total) by mouth daily at bedtime    Class 1 obesity without serious comorbidity with body mass index (BMI) of 34.0 to 34.9 in adult, unspecified obesity type  Prior Authorization Clinical Questions for Weight Management Pharmacotherapy    2. Does the patient have a diagnosis of obesity, confirmed by a BMI greater than or equal to 30 kg/m^2?: Yes  3. Does the patient have a BMI of greater than or equal to 27 kg/m^2 with at least one  weight-related comorbidity/risk factor/complication (e.g. diabetes, dyslipidemia, coronary artery disease)?: Yes  4. Weight-related co-morbidities/risk factors: polycystic ovary syndrome (PCOS)  5. Has the patient been on a weight loss regimen of low-calorie diet, increased physical activity, and lifestyle modifications for a minimum of 6 months?: Yes  6. Has the patient completed a comprehensive weight loss program (ie, Weight Watchers, Noom, Bariatrics, other paul on phone)? If so, what?: Yes   -- Q6 Further Explanation: my fitness pal   7. Does the patient have a history of type 2 diabetes?: No  8. Has the member tried and failed other weight loss medication within the past 12 months?: No  9. Will the member use requested medication in combination with another GLP agonist or weight loss drug?: No  10. Is the medication a controlled substance?: No     Baseline weight (in pounds): 211 lbs       Feels like since  she got out of college, she has progressively been gaining weight over time. Her weight then was 170 pounds.   Her goal weight is 180 pounds.   Would like to meet with nutritionist. Referral given.   Tries to walk 30 minutes 4-5 days per week.   Counseled on diet and exercise.     Orders:    Ambulatory Referral to Nutrition Services; Future    metFORMIN (GLUCOPHAGE) 500 mg tablet; Take 1 tablet (500 mg total) by mouth 2 (two) times a day with meals    PCOS (polycystic ovarian syndrome)  - has a hx of PCOS, does follow with obgyn.   - states that without OCP she does not get a menstrual cycle.   - has never been on Metformin. Would like to try weight loss medication like Wegovy but her insurance only covers this if she fails metformin.   - counseled on possible side effects with Metformin.   Orders:    FSH and LH; Future    Insulin, fasting; Future    Hemoglobin A1C; Future    Testosterone, free, total; Future    metFORMIN (GLUCOPHAGE) 500 mg tablet; Take 1 tablet (500 mg total) by mouth 2 (two) times a day  "with meals    Encounter for immunization    Orders:    influenza vaccine preservative-free 0.5 mL IM (Fluzone, Afluria, Fluarix, Flulaval)    Other fatigue    Orders:    CBC and differential; Future    Comprehensive metabolic panel; Future    TSH, 3rd generation with Free T4 reflex; Future    Vitamin D 25 hydroxy; Future    Screening cholesterol level    Orders:    Lipid panel; Future    Screening for HIV (human immunodeficiency virus)    Orders:    HIV 1/2 AG/AB w Reflex [facility] for 2 yr old and above; Future    Need for hepatitis C screening test    Orders:    Hepatitis C Antibody; Future       History of Present Illness     HPI      Review of Systems   Constitutional:  Positive for unexpected weight change. Negative for chills and fever.   HENT:  Negative for ear pain and sore throat.    Eyes:  Negative for pain and visual disturbance.   Respiratory:  Negative for cough and shortness of breath.    Cardiovascular:  Negative for chest pain and palpitations.   Gastrointestinal:  Positive for abdominal pain and nausea. Negative for constipation, diarrhea and vomiting.   Genitourinary:  Negative for dysuria and hematuria.   Musculoskeletal:  Negative for arthralgias and back pain.   Skin:  Negative for color change and rash.   Neurological:  Negative for seizures and syncope.   All other systems reviewed and are negative.          Objective     /80 (Patient Position: Sitting, Cuff Size: Large)   Pulse 99   Temp 99.1 °F (37.3 °C) (Tympanic)   Resp 18   Ht 5' 6\" (1.676 m)   Wt 96.1 kg (211 lb 12.8 oz)   LMP 11/12/2024 (Exact Date)   SpO2 99%   BMI 34.19 kg/m²     Physical Exam  Constitutional:       Appearance: Normal appearance. She is obese. She is not ill-appearing or toxic-appearing.   Cardiovascular:      Rate and Rhythm: Normal rate and regular rhythm.      Pulses: Normal pulses.      Heart sounds: Normal heart sounds.   Pulmonary:      Effort: Pulmonary effort is normal.      Breath sounds: Normal " breath sounds. No wheezing, rhonchi or rales.   Neurological:      Mental Status: She is alert.   Psychiatric:         Mood and Affect: Mood normal.         Behavior: Behavior normal.         Thought Content: Thought content normal.         Judgment: Judgment normal.

## 2024-11-13 NOTE — TELEPHONE ENCOUNTER
----- Message from Lynn BACA sent at 11/13/2024  9:47 AM EST -----  Regarding: Care Gap Request  11/13/24 9:47 AM    Hello, our patient listed abovehas had Pap Smear (HPV) aka Cervical Cancer Screening completed/performed. Please assist in updating the patient chart by making an External outreach to [facility] located in [place]. The date of service is 2024.    Thank you,  NOMI Anton [facility]

## 2025-01-21 ENCOUNTER — OFFICE VISIT (OUTPATIENT)
Dept: FAMILY MEDICINE CLINIC | Facility: CLINIC | Age: 30
End: 2025-01-21
Payer: COMMERCIAL

## 2025-01-21 VITALS
WEIGHT: 218.8 LBS | DIASTOLIC BLOOD PRESSURE: 72 MMHG | OXYGEN SATURATION: 100 % | TEMPERATURE: 98 F | BODY MASS INDEX: 35.17 KG/M2 | HEIGHT: 66 IN | SYSTOLIC BLOOD PRESSURE: 122 MMHG | HEART RATE: 121 BPM

## 2025-01-21 DIAGNOSIS — J45.20 MILD INTERMITTENT ASTHMA WITHOUT COMPLICATION: ICD-10-CM

## 2025-01-21 DIAGNOSIS — Z00.00 ANNUAL PHYSICAL EXAM: Primary | ICD-10-CM

## 2025-01-21 DIAGNOSIS — E66.9 OBESITY (BMI 30-39.9): ICD-10-CM

## 2025-01-21 DIAGNOSIS — U07.1 COVID: ICD-10-CM

## 2025-01-21 DIAGNOSIS — K21.9 GASTROESOPHAGEAL REFLUX DISEASE, UNSPECIFIED WHETHER ESOPHAGITIS PRESENT: ICD-10-CM

## 2025-01-21 PROCEDURE — 99395 PREV VISIT EST AGE 18-39: CPT | Performed by: NURSE PRACTITIONER

## 2025-01-21 PROCEDURE — 99214 OFFICE O/P EST MOD 30 MIN: CPT | Performed by: NURSE PRACTITIONER

## 2025-01-21 RX ORDER — METHYLPREDNISOLONE 4 MG/1
TABLET ORAL
Qty: 21 EACH | Refills: 0 | Status: SHIPPED | OUTPATIENT
Start: 2025-01-21

## 2025-01-21 RX ORDER — ALBUTEROL SULFATE 90 UG/1
2 INHALANT RESPIRATORY (INHALATION) EVERY 6 HOURS PRN
Qty: 18 G | Refills: 5 | Status: SHIPPED | OUTPATIENT
Start: 2025-01-21

## 2025-01-21 NOTE — PROGRESS NOTES
Adult Annual Physical  Name: Riya Hood      : 1995      MRN: [id number]  Encounter Provider: JOMAR Butler  Encounter Date: 2025   Encounter department: [facility]    Assessment & Plan  Annual physical exam         Mild intermittent asthma without complication  Dx when child, well controlled except when sick   Orders:  •  albuterol (Ventolin HFA) 90 mcg/act inhaler; Inhale 2 puffs every 6 (six) hours as needed for wheezing    COVID  +test on 24. Still has tightness and trouble taking deep breath. Otherwise feeling well. Medrol as ordered. S/s of when to return reviewed.   Orders:  •  methylPREDNISolone 4 MG tablet therapy pack; Use as directed on package    Gastroesophageal reflux disease, unspecified whether esophagitis present  Resolved with pepcid now using only prn        Obesity (BMI 30-39.9)      Started on semaglutide in the last week via a telehealth site. Counseled on lifestyle modification today        Immunizations and preventive care screenings were discussed with patient today. Appropriate education was printed on patient's after visit summary.    Counseling:  Dental Health: discussed importance of regular tooth brushing, flossing, and dental visits.  Exercise: the importance of regular exercise/physical activity was discussed. Recommend exercise 3-5 times per week for at least 30 minutes.          History of Present Illness     Adult Annual Physical:  Patient presents for annual physical.     Diet and Physical Activity:  - Diet/Nutrition: consuming 3-5 servings of fruits/vegetables daily. trying to cut down on sugar, whole grain, brown rice  - Exercise: walking and 5-7 times a week on average. strength- body weight exercises 2-3    Depression Screening:    - PHQ-9 Score: 3    General Health:  - Sleep: > 8 hours of sleep on average and sleeps well.  - Hearing: normal hearing bilateral ears.  - Vision: wears glasses, goes for regular eye  "exams and wears contacts.  - Dental: brushes teeth twice daily. switching dentist    /GYN Health:    - Contraception: oral contraceptives. 3 weeks ago      Review of Systems   Constitutional:  Negative for chills and fever.   HENT:  Negative for ear pain and sore throat.    Eyes:  Negative for pain and visual disturbance.   Respiratory:  Positive for chest tightness and shortness of breath. Negative for cough.    Cardiovascular:  Negative for chest pain and palpitations.   Gastrointestinal:  Negative for abdominal pain and vomiting.   Genitourinary:  Negative for dysuria and hematuria.   Musculoskeletal:  Negative for arthralgias and back pain.   Skin:  Negative for color change and rash.   Neurological:  Negative for seizures and syncope.   All other systems reviewed and are negative.        Objective   /72 (Patient Position: Sitting)   Pulse (!) 121   Temp 98 °F (36.7 °C) (Tympanic)   Ht 5' 6\" (1.676 m)   Wt 99.2 kg (218 lb 12.8 oz)   LMP 12/31/2024 (Approximate)   SpO2 100%   BMI 35.32 kg/m²     Physical Exam  Vitals and nursing note reviewed.   Constitutional:       General: She is not in acute distress.     Appearance: Normal appearance. She is well-developed and normal weight.   HENT:      Head: Normocephalic and atraumatic.      Right Ear: Tympanic membrane, ear canal and external ear normal. There is no impacted cerumen.      Left Ear: Tympanic membrane, ear canal and external ear normal. There is no impacted cerumen.      Nose: Nose normal. No congestion or rhinorrhea.      Mouth/Throat:      Mouth: Mucous membranes are moist.      Pharynx: Oropharynx is clear. No oropharyngeal exudate or posterior oropharyngeal erythema.   Eyes:      General: No scleral icterus.        Right eye: No discharge.         Left eye: No discharge.      Extraocular Movements: Extraocular movements intact.      Conjunctiva/sclera: Conjunctivae normal.      Pupils: Pupils are equal, round, and reactive to light. "   Cardiovascular:      Rate and Rhythm: Normal rate and regular rhythm.      Pulses: Normal pulses.      Heart sounds: Normal heart sounds. No murmur heard.  Pulmonary:      Effort: Pulmonary effort is normal. No respiratory distress.      Breath sounds: Wheezing (expiratory wheeze) present. No rales.   Abdominal:      General: Abdomen is flat. Bowel sounds are normal. There is no distension.      Palpations: Abdomen is soft.      Tenderness: There is no abdominal tenderness. There is no guarding.   Musculoskeletal:      Cervical back: Normal range of motion and neck supple. No rigidity or tenderness.   Lymphadenopathy:      Cervical: No cervical adenopathy.   Skin:     General: Skin is warm and dry.      Capillary Refill: Capillary refill takes less than 2 seconds.   Neurological:      General: No focal deficit present.      Mental Status: She is alert and oriented to person, place, and time. Mental status is at baseline.      Motor: No weakness.      Coordination: Coordination normal.      Gait: Gait normal.   Psychiatric:         Mood and Affect: Mood normal.         Behavior: Behavior normal.         Thought Content: Thought content normal.         Judgment: Judgment normal.

## 2025-01-21 NOTE — PATIENT INSTRUCTIONS
"Patient Education     Routine physical for adults   The Basics   Written by the doctors and editors at [facility]   What is a physical? -- A physical is a routine visit, or \"check-up,\" with your doctor. You might also hear it called a \"wellness visit\" or \"preventive visit.\"  During each visit, the doctor will:   Ask about your physical and mental health   Ask about your habits, behaviors, and lifestyle   Do an exam   Give you vaccines if needed   Talk to you about any medicines you take   Give advice about your health   Answer your questions  Getting regular check-ups is an important part of taking care of your health. It can help your doctor find and treat any problems you have. But it's also important for preventing health problems.  A routine physical is different from a \"sick visit.\" A sick visit is when you see a doctor because of a health concern or problem. Since physicals are scheduled ahead of time, you can think about what you want to ask the doctor.  How often should I get a physical? -- It depends on your age and health. In general, for people age 21 years and older:   If you are younger than 50 years, you might be able to get a physical every 3 years.   If you are 50 years or older, your doctor might recommend a physical every year.  If you have an ongoing health condition, like diabetes or high blood pressure, your doctor will probably want to see you more often.  What happens during a physical? -- In general, each visit will include:   Physical exam - The doctor or nurse will check your height, weight, heart rate, and blood pressure. They will also look at your eyes and ears. They will ask about how you are feeling and whether you have any symptoms that bother you.   Medicines - It's a good idea to bring a list of all the medicines you take to each doctor visit. Your doctor will talk to you about your medicines and answer any questions. Tell them if you are having any side effects that bother you. You " "should also tell them if you are having trouble paying for any of your medicines.   Habits and behaviors - This includes:   Your diet   Your exercise habits   Whether you smoke, drink alcohol, or use drugs   Whether you are sexually active   Whether you feel safe at home  Your doctor will talk to you about things you can do to improve your health and lower your risk of health problems. They will also offer help and support. For example, if you want to quit smoking, they can give you advice and might prescribe medicines. If you want to improve your diet or get more physical activity, they can help you with this, too.   Lab tests, if needed - The tests you get will depend on your age and situation. For example, your doctor might want to check your:   Cholesterol   Blood sugar   Iron level   Vaccines - The recommended vaccines will depend on your age, health, and what vaccines you already had. Vaccines are very important because they can prevent certain serious or deadly infections.   Discussion of screening - \"Screening\" means checking for diseases or other health problems before they cause symptoms. Your doctor can recommend screening based on your age, risk, and preferences. This might include tests to check for:   Cancer, such as breast, prostate, cervical, ovarian, colorectal, prostate, lung, or skin cancer   Sexually transmitted infections, such as chlamydia and gonorrhea   Mental health conditions like depression and anxiety  Your doctor will talk to you about the different types of screening tests. They can help you decide which screenings to have. They can also explain what the results might mean.   Answering questions - The physical is a good time to ask the doctor or nurse questions about your health. If needed, they can refer you to other doctors or specialists, too.  Adults older than 65 years often need other care, too. As you get older, your doctor will talk to you about:   How to prevent falling at " home   Hearing or vision tests   Memory testing   How to take your medicines safely   Making sure that you have the help and support you need at home  All topics are updated as new evidence becomes available and our peer review process is complete.  This topic retrieved from [employer] on: May 02, 2024.  Topic 118350 Version 1.0  Release: 32.4.3 - C32.122  © 2024 UpToDate, Inc. and/or its affiliates. All rights reserved.  Consumer Information Use and Disclaimer   Disclaimer: This generalized information is a limited summary of diagnosis, treatment, and/or medication information. It is not meant to be comprehensive and should be used as a tool to help the user understand and/or assess potential diagnostic and treatment options. It does NOT include all information about conditions, treatments, medications, side effects, or risks that may apply to a specific patient. It is not intended to be medical advice or a substitute for the medical advice, diagnosis, or treatment of a health care provider based on the health care provider's examination and assessment of a patient's specific and unique circumstances. Patients must speak with a health care provider for complete information about their health, medical questions, and treatment options, including any risks or benefits regarding use of medications. This information does not endorse any treatments or medications as safe, effective, or approved for treating a specific patient. UpToDate, Inc. and its affiliates disclaim any warranty or liability relating to this information or the use thereof.The use of this information is governed by the Terms of Use, available at https://www.woltersFamigouwer.com/en/know/clinical-effectiveness-terms. 2024© UpToDate, Inc. and its affiliates and/or licensors. All rights reserved.  Copyright   © 2024 UpToDate, Inc. and/or its affiliates. All rights reserved.

## 2025-01-27 ENCOUNTER — TELEMEDICINE (OUTPATIENT)
Dept: OTHER | Facility: HOSPITAL | Age: 30
End: 2025-01-27
Payer: COMMERCIAL

## 2025-01-27 DIAGNOSIS — N39.0 URINARY TRACT INFECTION WITHOUT HEMATURIA, SITE UNSPECIFIED: Primary | ICD-10-CM

## 2025-01-27 PROCEDURE — 99213 OFFICE O/P EST LOW 20 MIN: CPT | Performed by: NURSE PRACTITIONER

## 2025-01-27 RX ORDER — NITROFURANTOIN 25; 75 MG/1; MG/1
100 CAPSULE ORAL 2 TIMES DAILY
Qty: 60 CAPSULE | Refills: 0 | Status: SHIPPED | OUTPATIENT
Start: 2025-01-27

## 2025-01-27 NOTE — PATIENT INSTRUCTIONS
UA as ordered .  Start antibiotic. Take probiotic.  Increase oral fluids.  Tylenol or Motrin for pain or fever.  Azo for bladder spasms.  Follow up with PCP if no improvement.  Go to ER with abd pain, flank pain or worsening symptoms.       Urinary Tract Infection in Women   WHAT YOU NEED TO KNOW:   A urinary tract infection (UTI) is caused by bacteria that get inside your urinary tract. Most bacteria that enter your urinary tract come out when you urinate. If the bacteria stay in your urinary tract, you may get an infection. Your urinary tract includes your kidneys, ureters, bladder, and urethra. Urine is made in your kidneys, and it flows from the ureters to the bladder. Urine leaves the bladder through the urethra. A UTI is more common in your lower urinary tract, which includes your bladder and urethra.        DISCHARGE INSTRUCTIONS:   Return to the emergency department if:   You are urinating very little or not at all.    You have a high fever with shaking chills.     You have side or back pain that gets worse.  Contact your healthcare provider if:   You have a fever.    You do not feel better after 2 days of taking antibiotics.    You are vomiting.     You have questions or concerns about your condition or care.  Medicines:   Antibiotics  help fight a bacterial infection.     Medicines  may be given to decrease pain and burning when you urinate. They will also help decrease the feeling that you need to urinate often. These medicines will make your urine orange or red.    Take your medicine as directed.  Contact your healthcare provider if you think your medicine is not helping or if you have side effects. Tell him or her if you are allergic to any medicine. Keep a list of the medicines, vitamins, and herbs you take. Include the amounts, and when and why you take them. Bring the list or the pill bottles to follow-up visits. Carry your medicine list with you in case of an emergency.  Follow up with your  healthcare provider as directed:  Write down your questions so you remember to ask them during your visits.   Prevent another UTI:   Empty your bladder often.  Urinate and empty your bladder as soon as you feel the need. Do not hold your urine for long periods of time.    Wipe from front to back after you urinate or have a bowel movement.  This will help prevent germs from getting into your urinary tract through your urethra.    Drink liquids as directed.  Ask how much liquid to drink each day and which liquids are best for you. You may need to drink more liquids than usual to help flush out the bacteria. Do not drink alcohol, caffeine, or citrus juices. These can irritate your bladder and increase your symptoms. Your healthcare provider may recommend cranberry juice to help prevent a UTI.    Urinate after you have sex.  This can help flush out bacteria passed during sex.    Do not douche or use feminine deodorants.  These can change the chemical balance in your vagina.    Change sanitary pads or tampons often.  This will help prevent germs from getting into your urinary tract.     Do pelvic muscle exercises often.  Pelvic muscle exercises may help you start and stop urinating. Strong pelvic muscles may help you empty your bladder easier. Squeeze these muscles tightly for 5 seconds like you are trying to hold back urine. Then relax for 5 seconds. Gradually work up to squeezing for 10 seconds. Do 3 sets of 15 repetitions a day, or as directed.  © 2017 [employer] Information is for End User's use only and may not be sold, redistributed or otherwise used for commercial purposes. All illustrations and images included in CareNotes® are the copyrighted property of A.D.A.M., Inc. or [employer].  The above information is an  only. It is not intended as medical advice for individual conditions or treatments. Talk to your doctor, nurse or pharmacist before following any medical  regimen to see if it is safe and effective for you.

## 2025-01-27 NOTE — PROGRESS NOTES
Virtual Regular Visit  Name: Riya Hood      : 1995      MRN: [id number]  Encounter Provider: JOMAR Gonsales  Encounter Date: 2025   Encounter department: VIRTUAL CARE       Verification of patient location:  Patient is located at Home in the following state in which I hold an active license [place] :  Assessment & Plan  Urinary tract infection without hematuria, site unspecified    Orders:    UA w Reflex to Microscopic w Reflex to Culture; Future    nitrofurantoin (MACROBID) 100 mg capsule; Take 1 capsule (100 mg total) by mouth 2 (two) times a day        Encounter provider JOMAR Gonsales    The patient was identified by name and date of birth. Riya Hood was informed that this is a telemedicine visit and that the visit is being conducted through the Epic Embedded platform. She agrees to proceed..  My office door was closed. No one else was in the room.  She acknowledged consent and understanding of privacy and security of the video platform. The patient has agreed to participate and understands they can discontinue the visit at any time.    Patient is aware this is a billable service.     History was obtained from: History obtained from: patient  History of Present Illness     This is a 29 year old female here today for video visit.  She sates she started with urinary symptoms this morning.  She is having urgency, frequency and burning.  She denies any back pin but does have some suprapubic pain. No chance of pregnancy.  Last UTI was a couple years ago.       Review of Systems   Constitutional:  Negative for activity change, chills, fatigue and fever.   Genitourinary:  Positive for dysuria, frequency and urgency.   Musculoskeletal: Negative.    Skin: Negative.    Neurological: Negative.    Psychiatric/Behavioral: Negative.         Objective   LMP 2024 (Approximate)     Physical Exam  Constitutional:       General: She is not in acute distress.     Appearance: Normal  appearance. She is not ill-appearing or toxic-appearing.   HENT:      Head: Normocephalic and atraumatic.   Pulmonary:      Effort: Pulmonary effort is normal. No respiratory distress.   Abdominal:      Tenderness: There is no abdominal tenderness.   Neurological:      Mental Status: She is alert and oriented to person, place, and time.   Psychiatric:         Mood and Affect: Mood normal.         Behavior: Behavior normal.         Thought Content: Thought content normal.         Judgment: Judgment normal.         Visit Time  Total Visit Duration: 6 minutes not including the time spent for establishing the audio/video connection.

## 2025-02-20 ENCOUNTER — TELEPHONE (OUTPATIENT)
Age: 30
End: 2025-02-20

## 2025-02-20 NOTE — TELEPHONE ENCOUNTER
Patient has been added to the Medication Management wait list without a referral. Pt presently on medication and has a Provider but it is all virtual and she would like to have in person.    Insurance: [employer]  Insurance Type:    Commercial [x]   Medicaid []   [facility] (if applicable)   Medicare []  Location Preference: [place] or [facility]  Provider Preference: Female  Virtual: Yes [] No [x]  Were outside resources sent: Yes [] No [x]

## 2025-03-28 ENCOUNTER — OFFICE VISIT (OUTPATIENT)
Dept: URGENT CARE | Facility: CLINIC | Age: 30
End: 2025-03-28
Payer: COMMERCIAL

## 2025-03-28 VITALS
BODY MASS INDEX: 30.53 KG/M2 | TEMPERATURE: 98 F | HEIGHT: 66 IN | WEIGHT: 190 LBS | RESPIRATION RATE: 18 BRPM | HEART RATE: 114 BPM | OXYGEN SATURATION: 99 % | DIASTOLIC BLOOD PRESSURE: 85 MMHG | SYSTOLIC BLOOD PRESSURE: 137 MMHG

## 2025-03-28 DIAGNOSIS — J02.9 SORE THROAT: ICD-10-CM

## 2025-03-28 DIAGNOSIS — J02.9 ACUTE PHARYNGITIS, UNSPECIFIED ETIOLOGY: Primary | ICD-10-CM

## 2025-03-28 LAB — S PYO AG THROAT QL: NEGATIVE

## 2025-03-28 PROCEDURE — 87880 STREP A ASSAY W/OPTIC: CPT

## 2025-03-28 PROCEDURE — 99213 OFFICE O/P EST LOW 20 MIN: CPT

## 2025-03-28 PROCEDURE — 87070 CULTURE OTHR SPECIMN AEROBIC: CPT

## 2025-03-28 NOTE — PATIENT INSTRUCTIONS
Your rapid strep test was negative. No antibiotics are needed at this time.     Throat swab will be sent for definitive culture. You can download [employer] Whiteface591wed for the results which take approximately 48-72 hours. You will be notified if positive.     For sore throat you can use Cepacol lozenges, do warm salt water gargles, drink warm water with lemon or herbal teas, or use an over-the-counter throat spray (Chloraseptic).    Tylenol and Motrin for fevers, body aches, throat pain.    Drink plenty of fluids to stay hydrated.    Follow up with your PCP in 3-5 days if symptoms persist.    Go to the ER if symptoms significantly worsen.

## 2025-03-28 NOTE — PROGRESS NOTES
[facility]        NAME: Riya Hood is a 29 y.o. female  : 1995    MRN: [id number]  DATE: 2025  TIME: 9:52 AM    Assessment and Plan   Acute pharyngitis, unspecified etiology [J02.9]  1. Acute pharyngitis, unspecified etiology        2. Sore throat  POCT rapid strepA    Throat culture    Throat culture            Patient Instructions     Your rapid strep test was negative. No antibiotics are needed at this time.     Throat swab will be sent for definitive culture. You can download [facility] for the results which take approximately 48-72 hours. You will be notified if positive.     For sore throat you can use Cepacol lozenges, do warm salt water gargles, drink warm water with lemon or herbal teas, or use an over-the-counter throat spray (Chloraseptic).    Tylenol and Motrin for fevers, body aches, throat pain.    Drink plenty of fluids to stay hydrated.    Follow up with your PCP in 3-5 days if symptoms persist.    Go to the ER if symptoms significantly worsen.     If tests are performed, our office will contact you with results only if changes need to made to the care plan discussed with you at the visit. You can review your full results on [facility].      Chief Complaint     Chief Complaint   Patient presents with    Sore Throat     Sore throat, difficulty swallowing for 3 days         History of Present Illness       Sore Throat   This is a new problem. The current episode started in the past 7 days. The problem has been gradually worsening. The pain is worse on the left side. There has been no fever. The pain is at a severity of 8/10. The pain is severe. Associated symptoms include abdominal pain, diarrhea, ear pain, headaches, neck pain, swollen glands and trouble swallowing. Pertinent negatives include no congestion, coughing, drooling, ear discharge, hoarse voice, plugged ear sensation, shortness of breath, stridor or vomiting.       Review of Systems   Review  of Systems   HENT:  Positive for ear pain, sore throat and trouble swallowing. Negative for congestion, drooling, ear discharge and hoarse voice.    Respiratory:  Negative for cough, shortness of breath and stridor.    Gastrointestinal:  Positive for abdominal pain and diarrhea. Negative for vomiting.   Musculoskeletal:  Positive for neck pain.   Neurological:  Positive for headaches.         Current Medications       Current Outpatient Medications:     albuterol (Ventolin HFA) 90 mcg/act inhaler, Inhale 2 puffs every 6 (six) hours as needed for wheezing, Disp: 18 g, Rfl: 5    famotidine (PEPCID) 40 MG tablet, Take 1 tablet (40 mg total) by mouth daily at bedtime, Disp: 90 tablet, Rfl: 0    FLUoxetine (PROzac) 10 mg capsule, 3 capsules po daily, Disp: 270 capsule, Rfl: 1    hydrOXYzine HCL (ATARAX) 25 mg tablet, Take 1 tablet (25 mg total) by mouth every 6 (six) hours as needed for anxiety, Disp: 120 tablet, Rfl: 1    lamoTRIgine (LaMICtal) 25 mg tablet, , Disp: , Rfl:     Low-Ogestrel 0.3-30 MG-MCG per tablet, TK 1 T PO D, Disp: , Rfl:     SEMAGLUTIDE-WEIGHT MANAGEMENT SC, Inject under the skin, Disp: , Rfl:     methylPREDNISolone 4 MG tablet therapy pack, Use as directed on package (Patient not taking: Reported on 3/28/2025), Disp: 21 each, Rfl: 0    nitrofurantoin (MACROBID) 100 mg capsule, Take 1 capsule (100 mg total) by mouth 2 (two) times a day, Disp: 60 capsule, Rfl: 0    Current Allergies     Allergies as of 03/28/2025 - Reviewed 03/28/2025   Allergen Reaction Noted    Amoxicillin-pot clavulanate Hives 07/02/2020    Hazelnut (filbert) Hives, Itching, Swelling, and Throat Swelling 09/01/2024    Penicillins Rash 12/17/2019            The following portions of the patient's history were reviewed and updated as appropriate: allergies, current medications, past family history, past medical history, past social history, past surgical history and problem list.     Past Medical History:   Diagnosis Date    Allergic  "    Anxiety     Depression     GERD (gastroesophageal reflux disease)     Headache(784.0)     Panic disorder        Past Surgical History:   Procedure Laterality Date    TONSILLECTOMY         Family History   Problem Relation Age of Onset    Autoimmune disease Mother         Erythema Nodosum, possible von Willebrand’s    Hypertension Father     Personality disorder Sister     Autoimmune disease Sister         Hidradenitis suppurativa    Alcohol abuse Paternal Grandfather             Depression Paternal Grandfather     Dementia Paternal Grandfather     Anxiety disorder Maternal Aunt     Bipolar disorder Maternal Aunt     Depression Maternal Aunt     Drug abuse Maternal Aunt     Substance Abuse Maternal Aunt     OCD Maternal Aunt          Medications have been verified.        Objective   /85   Pulse (!) 114   Temp 98 °F (36.7 °C) (Temporal)   Resp 18   Ht 5' 6\" (1.676 m)   Wt 86.2 kg (190 lb)   SpO2 99%   BMI 30.67 kg/m²        Physical Exam     Physical Exam  Vitals and nursing note reviewed.   Constitutional:       General: She is not in acute distress.     Appearance: She is not ill-appearing.   HENT:      Head: Normocephalic and atraumatic.      Right Ear: Tympanic membrane, ear canal and external ear normal.      Left Ear: Tympanic membrane, ear canal and external ear normal.      Nose: Nose normal.      Mouth/Throat:      Mouth: Mucous membranes are moist.      Pharynx: Oropharynx is clear. Uvula midline. Posterior oropharyngeal erythema present. No oropharyngeal exudate.   Eyes:      Conjunctiva/sclera: Conjunctivae normal.   Cardiovascular:      Rate and Rhythm: Normal rate and regular rhythm.   Pulmonary:      Effort: Pulmonary effort is normal.      Breath sounds: Normal breath sounds.   Musculoskeletal:         General: Normal range of motion.      Cervical back: Normal range of motion and neck supple.   Skin:     General: Skin is warm and dry.   Neurological:      Mental Status: She " is alert and oriented to person, place, and time.

## 2025-03-30 LAB — BACTERIA THROAT CULT: NORMAL

## 2025-03-31 ENCOUNTER — RESULTS FOLLOW-UP (OUTPATIENT)
Dept: URGENT CARE | Facility: CLINIC | Age: 30
End: 2025-03-31

## 2025-04-08 ENCOUNTER — TELEMEDICINE (OUTPATIENT)
Dept: OTHER | Facility: HOSPITAL | Age: 30
End: 2025-04-08
Payer: COMMERCIAL

## 2025-04-08 DIAGNOSIS — J01.20 ACUTE NON-RECURRENT ETHMOIDAL SINUSITIS: Primary | ICD-10-CM

## 2025-04-08 PROCEDURE — 99213 OFFICE O/P EST LOW 20 MIN: CPT | Performed by: PHYSICIAN ASSISTANT

## 2025-04-08 RX ORDER — DOXYCYCLINE HYCLATE 100 MG
100 TABLET ORAL 2 TIMES DAILY
Qty: 14 TABLET | Refills: 0 | Status: SHIPPED | OUTPATIENT
Start: 2025-04-08 | End: 2025-04-15

## 2025-04-08 NOTE — PATIENT INSTRUCTIONS
"As discussed, sinus infections are typically viral and will get better on their own in 7-10 days. You should try symptomatic relief in the meantime with OTC antihistamine (Claritin or Zyrtec), Afrin for up to 3 days then switch to Flonase, and Sudafed (behind the counter) and mucinex. You can also try sinus rinses with a neti pot or nasal lavage (be sure to use distilled water.) Sleep with a cool mist humidifier at your bedside. If your symptoms do not improve after 7-10 days, you may need antibiotics because it is more likely to be bacterial at that point.  Follow-up with your primary care physician for recheck in 2-3 business days. Go to the ER for sudden severe headache, high fevers, change in vision, seizure activity or anything else that is concerning.    [facility] Phone number is [phone] if you need assistance or have further questions  Note for work/school can be found in \"Letters\" section of [employer]   "

## 2025-04-08 NOTE — PROGRESS NOTES
Virtual Regular Visit  Name: Riya Hood      : 1995      MRN: [id number]  Encounter Provider: Shannon D Severino, PA-C  Encounter Date: 2025   Encounter department: VIRTUAL CARE       Verification of patient location:  Patient is located at Home in the following state in which I hold an active license [place] :  Assessment & Plan  Acute non-recurrent ethmoidal sinusitis    Orders:    doxycycline hyclate (VIBRA-TABS) 100 mg tablet; Take 1 tablet (100 mg total) by mouth 2 (two) times a day for 7 days        Encounter provider Shannon D Severino, PA-C    The patient was identified by name and date of birth. Riya Hood was informed that this is a telemedicine visit and that the visit is being conducted through the Epic Embedded platform. She agrees to proceed..  My office door was closed. No one else was in the room. She acknowledged consent and understanding of privacy and security of the video platform. The patient has agreed to participate and understands they can discontinue the visit at any time.    Patient is aware this is a billable service.     History obtained from: patient  History of Present Illness     Pt reports cold sx x 10 days, but has rhinorrhea, cough, sinus pain and sinus HA. Ethmoid sinus pain. Ibuprofen with some relief. No fevers, CP, SOB, chest tightness, wheezing, VD.      Review of Systems   Constitutional:  Negative for fever.   HENT:  Positive for congestion, rhinorrhea, sinus pressure and sinus pain. Negative for nosebleeds.    Eyes:  Negative for redness.   Respiratory:  Positive for cough. Negative for shortness of breath.    Cardiovascular:  Negative for chest pain.   Gastrointestinal:  Negative for blood in stool.   Genitourinary:  Negative for hematuria.   Musculoskeletal:  Negative for gait problem.   Skin:  Negative for rash.   Neurological:  Positive for headaches. Negative for seizures.   Psychiatric/Behavioral:  Negative for behavioral problems.        Objective    There were no vitals taken for this visit.    Physical Exam  Constitutional:       General: She is not in acute distress.     Appearance: Normal appearance. She is not toxic-appearing.   HENT:      Head: Normocephalic and atraumatic.      Nose: No rhinorrhea.      Mouth/Throat:      Mouth: Mucous membranes are moist.   Eyes:      Conjunctiva/sclera: Conjunctivae normal.      Comments: glasses   Pulmonary:      Effort: Pulmonary effort is normal. No respiratory distress.      Breath sounds: No wheezing (no gross audible wheeze through computer).   Musculoskeletal:      Cervical back: Normal range of motion.   Skin:     Findings: No rash (on face or neck).   Neurological:      Mental Status: She is alert.      Cranial Nerves: No dysarthria or facial asymmetry.   Psychiatric:         Mood and Affect: Mood normal.         Behavior: Behavior normal.         Visit Time  Total Visit Duration: 5 minutes not including the time spent for establishing the audio/video connection.

## 2025-04-23 ENCOUNTER — OFFICE VISIT (OUTPATIENT)
Dept: URGENT CARE | Facility: CLINIC | Age: 30
End: 2025-04-23
Payer: COMMERCIAL

## 2025-04-23 VITALS
WEIGHT: 192 LBS | TEMPERATURE: 98 F | OXYGEN SATURATION: 99 % | RESPIRATION RATE: 20 BRPM | DIASTOLIC BLOOD PRESSURE: 90 MMHG | SYSTOLIC BLOOD PRESSURE: 136 MMHG | BODY MASS INDEX: 30.99 KG/M2 | HEART RATE: 110 BPM

## 2025-04-23 DIAGNOSIS — R51.9 ACUTE NONINTRACTABLE HEADACHE, UNSPECIFIED HEADACHE TYPE: Primary | ICD-10-CM

## 2025-04-23 PROCEDURE — 99213 OFFICE O/P EST LOW 20 MIN: CPT

## 2025-04-23 PROCEDURE — 96372 THER/PROPH/DIAG INJ SC/IM: CPT

## 2025-04-23 RX ORDER — KETOROLAC TROMETHAMINE 30 MG/ML
30 INJECTION, SOLUTION INTRAMUSCULAR; INTRAVENOUS ONCE
Status: COMPLETED | OUTPATIENT
Start: 2025-04-23 | End: 2025-04-23

## 2025-04-23 RX ORDER — PREDNISONE 10 MG/1
TABLET ORAL
Qty: 20 TABLET | Refills: 0 | Status: SHIPPED | OUTPATIENT
Start: 2025-04-23

## 2025-04-23 RX ADMIN — KETOROLAC TROMETHAMINE 30 MG: 30 INJECTION, SOLUTION INTRAMUSCULAR; INTRAVENOUS at 13:57

## 2025-04-23 NOTE — PROGRESS NOTES
[facility]        NAME: Riya Hood is a 29 y.o. female  : 1995    MRN: [id number]  DATE: 2025  TIME: 2:08 PM    Assessment and Plan   Acute nonintractable headache, unspecified headache type [R51.9]  1. Acute nonintractable headache, unspecified headache type  ketorolac (TORADOL) injection 30 mg    predniSONE 10 mg tablet        LMP was 3 weeks ago, states she is on birth control and no chance of pregnancy.  Patient was monitored for approximately 10 minutes prior to leaving the department after receiving medication.  No adverse reaction reported.  Patient left department with a steady gait.      Patient Instructions   You were given a 1 time dose of a medication called Toradol which is an NSAID and is effective for headaches in large sample of patients who has taken the medication to treat migraines.  If you do not have any relief from getting this medication in about 2 hours. Then start the steroids as was prescribed along with taking Tylenol.  Would recommend following up with Neurology as you do report headaches frequently throughout your life.      Follow up with Primary Care Provider in 3-5 days if not improving.  Proceed to Emergency Department if symptoms worsen.    If tests have been performed at [facility], our office will contact you with results if changes need to be made to the care plan discussed with you at the visit.  You [facility]    Chief Complaint     Chief Complaint   Patient presents with   • Headache     Frontal  headache X 2 weeks         History of Present Illness       Patient reports headache starting about 2 weeks ago around the time when she had a sinus infection.  She reports at that time the headache was in the frontal area whereas now it is along the occipital and temporal area.  Reports no specific injury.  States that she has been taking Tylenol and ibuprofen without relief.  She reports that throughout her  lifetime she has been having headaches every now and then but they would go away in 3 to 5 days on its own.  She also reports that caffeine tends to give her headaches so she has been staying away from caffeine and also staying away from Excedrin.  She is going on a trip to [place] next week and would like this headache to be gone before then.    Headache      Review of Systems   Review of Systems   Constitutional:  Negative for chills and fever.   HENT:  Negative for congestion, ear pain and sore throat.    Eyes:  Negative for pain and visual disturbance.   Respiratory:  Negative for cough and shortness of breath.    Cardiovascular:  Negative for chest pain and palpitations.   Gastrointestinal:  Negative for abdominal pain, diarrhea, nausea and vomiting.   Genitourinary:  Negative for dysuria and hematuria.   Musculoskeletal:  Negative for arthralgias and back pain.   Skin:  Negative for color change and rash.   Neurological:  Positive for headaches. Negative for seizures and syncope.   All other systems reviewed and are negative.        Current Medications       Current Outpatient Medications:   •  predniSONE 10 mg tablet, Total tablets 20 tablets: Number of tablets per day: 4, 4, 3, 3, 2, 2, 1, 1, Disp: 20 tablet, Rfl: 0  •  albuterol (Ventolin HFA) 90 mcg/act inhaler, Inhale 2 puffs every 6 (six) hours as needed for wheezing, Disp: 18 g, Rfl: 5  •  famotidine (PEPCID) 40 MG tablet, Take 1 tablet (40 mg total) by mouth daily at bedtime, Disp: 90 tablet, Rfl: 0  •  FLUoxetine (PROzac) 10 mg capsule, 3 capsules po daily, Disp: 270 capsule, Rfl: 1  •  hydrOXYzine HCL (ATARAX) 25 mg tablet, Take 1 tablet (25 mg total) by mouth every 6 (six) hours as needed for anxiety, Disp: 120 tablet, Rfl: 1  •  lamoTRIgine (LaMICtal) 25 mg tablet, , Disp: , Rfl:   •  Low-Ogestrel 0.3-30 MG-MCG per tablet, TK 1 T PO D, Disp: , Rfl:   •  SEMAGLUTIDE-WEIGHT MANAGEMENT SC, Inject under the skin, Disp: , Rfl:   No current  facility-administered medications for this visit.    Current Allergies     Allergies as of 2025 - Reviewed 2025   Allergen Reaction Noted   • Amoxicillin-pot clavulanate Hives 2020   • Hazelnut (filbert) Hives, Itching, Swelling, and Throat Swelling 2024   • Penicillins Rash 2019            The following portions of the patient's history were reviewed and updated as appropriate: allergies, current medications, past family history, past medical history, past social history, past surgical history and problem list.     Past Medical History:   Diagnosis Date   • Allergic    • Anxiety    • Depression    • GERD (gastroesophageal reflux disease)    • Headache(784.0)    • Panic disorder        Past Surgical History:   Procedure Laterality Date   • TONSILLECTOMY         Family History   Problem Relation Age of Onset   • Autoimmune disease Mother         Erythema Nodosum, possible von Willebrand’s   • Hypertension Father    • Personality disorder Sister    • Autoimmune disease Sister         Hidradenitis suppurativa   • Alcohol abuse Paternal Grandfather            • Depression Paternal Grandfather    • Dementia Paternal Grandfather    • Anxiety disorder Maternal Aunt    • Bipolar disorder Maternal Aunt    • Depression Maternal Aunt    • Drug abuse Maternal Aunt    • Substance Abuse Maternal Aunt    • OCD Maternal Aunt          Medications have been verified.        Objective   /90   Pulse (!) 110   Temp 98 °F (36.7 °C)   Resp 20   Wt 87.1 kg (192 lb)   SpO2 99%   BMI 30.99 kg/m²   No LMP recorded.      Physical Exam     Physical Exam  Vitals and nursing note reviewed.   Constitutional:       Appearance: Normal appearance.   HENT:      Head: Normocephalic and atraumatic.        Right Ear: Tympanic membrane normal.      Left Ear: Tympanic membrane normal.      Nose: Nose normal. No congestion or rhinorrhea.      Mouth/Throat:      Mouth: Mucous membranes are moist.   Eyes:       Pupils: Pupils are equal, round, and reactive to light.   Cardiovascular:      Rate and Rhythm: Normal rate.      Pulses: Normal pulses.      Heart sounds: Normal heart sounds.   Pulmonary:      Effort: Pulmonary effort is normal.      Breath sounds: Normal breath sounds.   Skin:     General: Skin is warm and dry.      Capillary Refill: Capillary refill takes less than 2 seconds.   Neurological:      General: No focal deficit present.      Mental Status: She is alert and oriented to person, place, and time. Mental status is at baseline.      Sensory: No sensory deficit.      Motor: No weakness.   Psychiatric:         Mood and Affect: Mood normal.         Behavior: Behavior normal.         Thought Content: Thought content normal.

## 2025-04-28 ENCOUNTER — TELEPHONE (OUTPATIENT)
Age: 30
End: 2025-04-28

## 2025-05-22 ENCOUNTER — OFFICE VISIT (OUTPATIENT)
Dept: NEUROLOGY | Facility: CLINIC | Age: 30
End: 2025-05-22
Payer: COMMERCIAL

## 2025-05-22 VITALS
TEMPERATURE: 98 F | BODY MASS INDEX: 31.85 KG/M2 | DIASTOLIC BLOOD PRESSURE: 90 MMHG | SYSTOLIC BLOOD PRESSURE: 152 MMHG | OXYGEN SATURATION: 99 % | WEIGHT: 197.3 LBS | HEART RATE: 104 BPM

## 2025-05-22 DIAGNOSIS — R11.0 NAUSEA: ICD-10-CM

## 2025-05-22 DIAGNOSIS — G43.709 CHRONIC MIGRAINE WITHOUT AURA WITHOUT STATUS MIGRAINOSUS, NOT INTRACTABLE: Primary | ICD-10-CM

## 2025-05-22 DIAGNOSIS — F41.1 GENERALIZED ANXIETY DISORDER: ICD-10-CM

## 2025-05-22 PROCEDURE — 99204 OFFICE O/P NEW MOD 45 MIN: CPT

## 2025-05-22 RX ORDER — METOCLOPRAMIDE 10 MG/1
10 TABLET ORAL EVERY 6 HOURS PRN
Qty: 20 TABLET | Refills: 1 | Status: SHIPPED | OUTPATIENT
Start: 2025-05-22

## 2025-05-22 RX ORDER — CLONAZEPAM 0.5 MG/1
0.5 TABLET ORAL 2 TIMES DAILY PRN
COMMUNITY
Start: 2025-02-15

## 2025-05-22 RX ORDER — FLUOXETINE HYDROCHLORIDE 40 MG/1
40 CAPSULE ORAL DAILY
COMMUNITY

## 2025-05-22 RX ORDER — RIZATRIPTAN BENZOATE 10 MG/1
10 TABLET ORAL AS NEEDED
Qty: 10 TABLET | Refills: 3 | Status: SHIPPED | OUTPATIENT
Start: 2025-05-22

## 2025-05-22 RX ORDER — LAMOTRIGINE 100 MG/1
100 TABLET ORAL DAILY
COMMUNITY

## 2025-05-22 NOTE — PATIENT INSTRUCTIONS
Additional Testing:   Neurodiagnostic workup: MRI Brain without contrast ordered    Headache Calendar  Please maintain a headache calendar  Consider using phone applications such as Migraine Buddy or Migraine Diary    Headache/migraine treatment:     Rescue medications (for immediate treatment of a headache):   It is ok to take ibuprofen, acetaminophen or naproxen (Advil, Tylenol,  Aleve, Excedrin) if they help your headaches you should limit these to No more than 3 times a week to avoid medication overuse/rebound headaches.     For your more moderate to severe migraines take this medication early   - Start Maxalt (rizatriptan) 10mg tabs - take one at the onset of headache. May repeat one time after 2 hours if pain has not resolved.   (Max 2 a day and 10 a month)     - Start Reglan 10 mg, Take 1 tablet (10 mg) by mouth once as needed every 6 hours for nausea/vomiting. Can combine with Maxalt for abortive therapy.     Over the counter preventive supplements for headaches/migraines (if you try, try for 3 months straight)  (to take every day to help prevent headaches - not to take at the time of headache):  There are combo pills online of these - none of which regulated by FDA and double check dosing - take appropriate dose only once a day- prevent a migraine, migravent, mind ease, migrelief   [] Magnesium 400mg daily (If any diarrhea or upset stomach, decrease dose  as tolerated)  [] Riboflavin (Vitamin B2) 400mg daily (may make your urine bright/neon yellow)  - All supplements can be purchased online    Lifestyle Recommendations:  [x] SLEEP - Maintain a regular sleep schedule: Adults need at least 7-8 hours of uninterrupted a night. Maintain good sleep hygiene:  Going to bed and waking up at consistent times, avoiding excessive daytime naps, avoiding caffeinated beverages in the evening, avoid excessive stimulation in the evening and generally using bed primarily for sleeping.  One hour before bedtime would recommend  turning lights down lower, decreasing your activity (may read quietly, listen to music at a low volume). When you get into bed, should eliminate all technology (no texting, emailing, playing with your phone, iPad or tablet in bed).  [x] HYDRATION - Maintain good hydration.  Drink  2L of fluid a day (4 typical small water bottles)  [x] DIET - Maintain good nutrition. In particular don't skip meals and try and eat healthy balanced meals regularly.  [x] TRIGGERS - Look for other triggers and avoid them: Limit caffeine to 1-2 cups a day or less. Avoid dietary triggers that you have noticed bring on your headaches (this could include aged cheese, peanuts, MSG, aspartame and nitrates).  [x] EXERCISE - physical exercise as we all know is good for you in many ways, and not only is good for your heart, but also is beneficial for your mental health, cognitive health and  chronic pain/headaches. I would encourage at the least 5 days of physical exercise weekly for at least 30 minutes.     Education and Follow-up  [x] Please call with any questions or concerns. Of course if any new concerning symptoms go to the emergency department.  [x] Follow up in 3 months with Cristhian VIGIL

## 2025-05-22 NOTE — PROGRESS NOTES
Name: Riya Hood      : 1995      MRN: [id number]  Encounter Provider: Breezy Olmos PA-C  Encounter Date: 2025   Encounter department: [facility]  :  Assessment & Plan  Chronic migraine without aura without status migrainosus, not intractable  I had the pleasure of seeing Riya today in the office at [facility] in [place].  She is presenting today for an initial new patient consultation in regard to her headaches.  The patient stated that she has been having headaches for many years, ever since she was 8 or 9 years old.  She did feel her headaches had been worsening as she had been getting older.  Currently experiencing 15/30 days a month with some type headache, 1 or 2 days out of the month with more severe debilitating headaches.  No particular time of the day at which the headaches occur.  Besides her last headache that lasted 2 weeks, usually her headaches last about 3 to 5 days.  She will not have an aura with the headaches when they occur.  Notes bilateral frontal region and retro-orbital pain, recent headache started in the front and radiate towards the back of the head.  She does have associated symptoms of nausea, diarrhea, stiff sore neck, problems with concentration, photophobia, phonophobia, osmophobia, lightheadedness/dizziness, and lacrimation with the headaches.  No blurred vision or loss of vision noted.  Bending over or any type of head or neck movement will make headaches worse.  Does have some exertional headaches while working out.  No positional change headaches noted.  She had never seen a neurologist in the past, does not have any prior neuroimaging as well.    Based on my evaluation of the patient, seems likely that she is experiencing chronic migraine without aura.  As far as for further workup, did recommend that the patient probably have some type of neuroimaging moving forward.  Patient agreed and ordered  MRI brain without contrast for further evaluation of the patient's headaches and to rule out any intracranial abnormality.  Did discuss preventative medication plan with the patient, although the patient was more so wanting to take abortive medications at this time.  Recommended that she start Maxalt 10 mg as needed and start Reglan 10 mg as needed.  Advised patient to take both of the medications together for abortive therapy of her migraines when they occur.  Advised patient that she could certainly trial magnesium B2 supplementation if she would like as well.  No other questions or concerns, advised patient to follow-up in 3 months time with Cristhian VIGIL.      Orders:    metoclopramide (Reglan) 10 mg tablet; Take 1 tablet (10 mg total) by mouth every 6 (six) hours as needed (nausea/vomiting)    rizatriptan (Maxalt) 10 mg tablet; Take 1 tablet (10 mg total) by mouth as needed for migraine Take at the onset of migraine; if symptoms continue or return, may take another dose at least 2 hours after first dose. Take no more than 2 doses in a day.    MRI brain without contrast; Future    Nausea    Orders:    metoclopramide (Reglan) 10 mg tablet; Take 1 tablet (10 mg total) by mouth every 6 (six) hours as needed (nausea/vomiting)    Generalized anxiety disorder           Patient Instructions   Additional Testing:   Neurodiagnostic workup: MRI Brain without contrast ordered    Headache Calendar  Please maintain a headache calendar  Consider using phone applications such as Migraine Buddy or Migraine Diary    Headache/migraine treatment:     Rescue medications (for immediate treatment of a headache):   It is ok to take ibuprofen, acetaminophen or naproxen (Advil, Tylenol,  Aleve, Excedrin) if they help your headaches you should limit these to No more than 3 times a week to avoid medication overuse/rebound headaches.     For your more moderate to severe migraines take this medication early   - Start Maxalt (rizatriptan) 10mg  tabs - take one at the onset of headache. May repeat one time after 2 hours if pain has not resolved.   (Max 2 a day and 10 a month)     - Start Reglan 10 mg, Take 1 tablet (10 mg) by mouth once as needed every 6 hours for nausea/vomiting. Can combine with Maxalt for abortive therapy.     Over the counter preventive supplements for headaches/migraines (if you try, try for 3 months straight)  (to take every day to help prevent headaches - not to take at the time of headache):  There are combo pills online of these - none of which regulated by FDA and double check dosing - take appropriate dose only once a day- prevent a migraine, migravent, mind ease, migrelief   [] Magnesium 400mg daily (If any diarrhea or upset stomach, decrease dose  as tolerated)  [] Riboflavin (Vitamin B2) 400mg daily (may make your urine bright/neon yellow)  - All supplements can be purchased online    Lifestyle Recommendations:  [x] SLEEP - Maintain a regular sleep schedule: Adults need at least 7-8 hours of uninterrupted a night. Maintain good sleep hygiene:  Going to bed and waking up at consistent times, avoiding excessive daytime naps, avoiding caffeinated beverages in the evening, avoid excessive stimulation in the evening and generally using bed primarily for sleeping.  One hour before bedtime would recommend turning lights down lower, decreasing your activity (may read quietly, listen to music at a low volume). When you get into bed, should eliminate all technology (no texting, emailing, playing with your phone, iPad or tablet in bed).  [x] HYDRATION - Maintain good hydration.  Drink  2L of fluid a day (4 typical small water bottles)  [x] DIET - Maintain good nutrition. In particular don't skip meals and try and eat healthy balanced meals regularly.  [x] TRIGGERS - Look for other triggers and avoid them: Limit caffeine to 1-2 cups a day or less. Avoid dietary triggers that you have noticed bring on your headaches (this could include  aged cheese, peanuts, MSG, aspartame and nitrates).  [x] EXERCISE - physical exercise as we all know is good for you in many ways, and not only is good for your heart, but also is beneficial for your mental health, cognitive health and  chronic pain/headaches. I would encourage at the least 5 days of physical exercise weekly for at least 30 minutes.     Education and Follow-up  [x] Please call with any questions or concerns. Of course if any new concerning symptoms go to the emergency department.  [x] Follow up in 3 months with Cristhian VIGIL      History of Present Illness   HPI   Current medical illnesses  or past medical history include polycystic ovary syndrome, panic disorder, major depressive disorder, generalized anxiety disorder      Interval History:    Headaches started at what age? 7-8 years old, does feel headaches are worsening as getting older  How often do the headaches occur?   - as of 5/22/2025: 15/30 days in the month with some type of headache, 1-2/30 days in the month with more severe headaches  What time of the day do the headaches start?  No particular time of day   How long do the headaches last? Besides last headache that lasted 2 weeks she would usually have headaches last 3-5 days. Tylenol and ibuprofen does seem to help but working much less now   Are you ever headache free? Yes    Aura? without aura     Where is your headache located and pain quality? Bilateral frontal region and retroorbital pain, recent headache started in the front and radiated back. Pressure and sharp pain   What is the intensity of pain? Worst 10/10, Average: 7/10  Associated symptoms:   [x] Nausea     [x] Diarrhea  [x] Stiff or sore neck   [x] Problems with concentration  [x] Photophobia     [x]Phonophobia      [x] Osmophobia  [x] No blurred vision or loss of vision   [x] Prefer quiet, dark room  [x] Light-headed or dizzy     [x] Lacrimation     Things that make the headache worse? Bending over or any type of head or  neck movement will make it worse. Does have some exertional headaches with working out     Any positional change headaches? No positional change headaches    Headache triggers:  motion sickness/movement, repetitive motion, caffeine      Have you seen someone else for headaches or pain? No  Have you had trigger point injection performed and how often? No  Have you had Botox injection performed and how often? No   Have you had epidural injections or transforaminal injections performed? No  Are you current pregnant or planning on getting pregnant? Not currently pregnant or planning on pregnancy. Does take oral contraception.   Have you ever had any Brain imaging? no    Last eye exam: has been more than a year since last eye doctor visit. Prescription has been stable.     What medications do you take or have you taken for your headaches?   ABORTIVE:    OTC medications: tylenol, ibuprofen   Prescription: None    Past/ failed/contraindicated:  OTC medications: Excedrin migraine   Prescription: prednisone, toradol IM    PREVENTIVE:   Prozac 40 mg daily, hydroxyzine 25 mg PRN, lamictal 100 mg daily, klonopin 0.5 mg PRN    Past/ failed/contraindicated:  Cymbalta, Lexapro, Celexa, Paxil      LIFESTYLE  Sleep   - averages: 8 hours of sleep at night  Problems falling asleep?:   No  Problems staying asleep?:  No    - Has been told she snores in her sleep, no issues with breathing or apnea     Physical activity: does mostly go on walks in the week     Water: 3, 40 ounce water bottles per day  Caffeine: no daily caffeine intake     Mood: Anxiety and depression in the past. She does have a psychiatrist she sees, she does follow with a therapist as well.     The following portions of the patient's history were reviewed and updated as appropriate: allergies, current medications, past family history, past medical history, past social history, past surgical history and problem list.    Pertinent family history:  Family history of  headaches:  migraine headaches in grandmother and cousin and cluster headaches for her mother   Any family history of aneurysms - No    Pertinent social history:  Work: , works at home   Education: Bachelor's degree   Lives with fiance     Illicit Drugs: denies  Alcohol/tobacco: Denies tobacco use, alcohol intake: social drinker     Review of Systems   Constitutional:  Negative for appetite change, fatigue and fever.   HENT: Negative.  Negative for hearing loss, tinnitus, trouble swallowing and voice change.    Eyes:  Positive for photophobia (phonophobia) and pain. Negative for visual disturbance.   Respiratory: Negative.  Negative for shortness of breath.    Cardiovascular: Negative.  Negative for palpitations.   Gastrointestinal:  Positive for nausea. Negative for vomiting.   Endocrine: Negative.  Negative for cold intolerance.   Genitourinary: Negative.  Negative for dysuria, frequency and urgency.   Musculoskeletal:  Negative for back pain, gait problem, myalgias, neck pain and neck stiffness.   Skin: Negative.  Negative for rash.   Allergic/Immunologic: Negative.    Neurological:  Positive for dizziness and headaches (4/30HA 1/30 worse). Negative for tremors, seizures, syncope, facial asymmetry, speech difficulty, weakness, light-headedness and numbness.   Hematological: Negative.  Does not bruise/bleed easily.   Psychiatric/Behavioral: Negative.  Negative for confusion, hallucinations and sleep disturbance.    All other systems reviewed and are negative.   I have personally reviewed the MA's review of systems and made changes as necessary.    Medical History Reviewed by provider this encounter:     .  Past Medical History   Past Medical History[1]  Past Surgical History[2]  Family History[3]   reports that she has never smoked. She has never used smokeless tobacco. She reports current alcohol use of about 3.0 standard drinks of alcohol per week. She reports that she does not use  drugs.  Current Outpatient Medications   Medication Instructions    albuterol (Ventolin HFA) 90 mcg/act inhaler 2 puffs, Inhalation, Every 6 hours PRN    clonazePAM (KLONOPIN) 0.5 mg, Oral, 2 times daily PRN    FLUoxetine (PROZAC) 40 mg, Daily    hydrOXYzine HCL (ATARAX) 25 mg, Oral, Every 6 hours PRN    lamoTRIgine (LAMICTAL) 100 mg, Daily    Low-Ogestrel 0.3-30 MG-MCG per tablet     SEMAGLUTIDE-WEIGHT MANAGEMENT SC Subcutaneous   Allergies[4]   Medications Ordered Prior to Encounter[5]   Social History[6]     Objective   There were no vitals taken for this visit.    Physical Exam  Neurological Exam    Physical Exam:                                                                 Vitals:            Constitutional:    /90 (BP Location: Right arm, Patient Position: Sitting, Cuff Size: Standard)   Pulse 104   Temp 98 °F (36.7 °C) (Temporal)   Wt 89.5 kg (197 lb 4.8 oz)   SpO2 99%   BMI 31.85 kg/m²   BP Readings from Last 3 Encounters:   05/22/25 152/90   04/23/25 136/90   03/28/25 137/85     Pulse Readings from Last 3 Encounters:   05/22/25 104   04/23/25 (!) 110   03/28/25 (!) 114         Well developed, well nourished, well groomed. No dysmorphic features.       Psychiatric:  Normal behavior and appropriate affect        Neurological Examination:     Mental status/cognitive function:   Orientated to time, place and person. Recent and remote memory intact. Attention span and concentration as well as fund of knowledge are appropriate for age. Normal language and spontaneous speech.    Cranial Nerves:  II-visual fields full.   III, IV, VI-Pupils were equal, round, and reactive to light and accomodation. Extraocular movements were full and conjugate without nystagmus. Conjugate gaze, normal smooth pursuits, normal saccades   V-facial sensation symmetric.    VII-facial expression symmetric, intact forehead wrinkle, strong eye closure, symmetric smile    VIII-hearing grossly intact bilaterally   IX, X-palate  elevation symmetric, no dysarthria.   XI-shoulder shrug strength intact    XII-tongue protrusion midline.    Motor Exam: symmetric bulk and tone throughout, no pronator drift. Power/strength 5/5 bilateral upper and lower extremities, no atrophy, fasciculations or abnormal movements noted.   Sensory: grossly intact light touch in all extremities.   Reflexes: brachioradialis 2+, biceps 2+, knee 2+ bilaterally  Coordination: Finger nose finger intact bilaterally, no apparent dysmetria, ataxia or tremor noted  Gait: steady casual and tandem gait.        Administrative Statements   I have spent a total time of 50 minutes in caring for this patient on the day of the visit/encounter including Risks and benefits of tx options, Instructions for management, Patient and family education, Importance of tx compliance, Risk factor reductions, Impressions, Counseling / Coordination of care, Documenting in the medical record, Reviewing/placing orders in the medical record (including tests, medications, and/or procedures), and Obtaining or reviewing history  .         [1]   Past Medical History:  Diagnosis Date    Allergic     Anxiety     Depression     GERD (gastroesophageal reflux disease)     Headache(784.0)     Panic disorder    [2]   Past Surgical History:  Procedure Laterality Date    TONSILLECTOMY     [3]   Family History  Problem Relation Name Age of Onset    Autoimmune disease Mother Mayra         Erythema Nodosum, possible von Willebrand’s    Hypertension Father Art     Personality disorder Sister Karin     Autoimmune disease Sister Karin         Hidradenitis suppurativa    Alcohol abuse Paternal Grandfather Ye             Depression Paternal Grandfather Ye     Dementia Paternal Grandfather Ye     Anxiety disorder Maternal Aunt Ina     Bipolar disorder Maternal Aunt Ina     Depression Maternal Aunt Ina     Drug abuse Maternal Aunt Ina     Substance Abuse Maternal Aunt Ina     OCD  Maternal Aunt Nannette    [4]   Allergies  Allergen Reactions    Amoxicillin-Pot Clavulanate Hives    Hazelnut (Filbert) Hives, Itching, Swelling and Throat Swelling    Penicillins Rash   [5]   Current Outpatient Medications on File Prior to Visit   Medication Sig Dispense Refill    albuterol (Ventolin HFA) 90 mcg/act inhaler Inhale 2 puffs every 6 (six) hours as needed for wheezing 18 g 5    clonazePAM (KlonoPIN) 0.5 mg tablet Take 0.5 mg by mouth 2 (two) times a day as needed      FLUoxetine (PROzac) 40 MG capsule Take 40 mg by mouth daily      hydrOXYzine HCL (ATARAX) 25 mg tablet Take 1 tablet (25 mg total) by mouth every 6 (six) hours as needed for anxiety 120 tablet 1    lamoTRIgine (LaMICtal) 100 mg tablet Take 100 mg by mouth daily      Low-Ogestrel 0.3-30 MG-MCG per tablet       SEMAGLUTIDE-WEIGHT MANAGEMENT SC Inject under the skin      [DISCONTINUED] FLUoxetine (PROzac) 10 mg capsule 3 capsules po daily 270 capsule 1    [DISCONTINUED] lamoTRIgine (LaMICtal) 25 mg tablet       [DISCONTINUED] famotidine (PEPCID) 40 MG tablet Take 1 tablet (40 mg total) by mouth daily at bedtime (Patient not taking: Reported on 5/22/2025) 90 tablet 0    [DISCONTINUED] predniSONE 10 mg tablet Total tablets 20 tablets: Number of tablets per day: 4, 4, 3, 3, 2, 2, 1, 1 (Patient not taking: Reported on 5/22/2025) 20 tablet 0     No current facility-administered medications on file prior to visit.   [6]   Social History  Tobacco Use    Smoking status: Never    Smokeless tobacco: Never   Vaping Use    Vaping status: Never Used   Substance and Sexual Activity    Alcohol use: Yes     Alcohol/week: 3.0 standard drinks of alcohol     Types: 2 Glasses of wine, 1 Shots of liquor per week     Comment: 3-4 drinks per week    Drug use: Never    Sexual activity: Yes     Partners: Male     Birth control/protection: OCP

## 2025-06-17 ENCOUNTER — APPOINTMENT (OUTPATIENT)
Age: 30
End: 2025-06-17
Attending: NURSE PRACTITIONER
Payer: COMMERCIAL

## 2025-06-17 DIAGNOSIS — Z11.4 SCREENING FOR HIV (HUMAN IMMUNODEFICIENCY VIRUS): ICD-10-CM

## 2025-06-17 DIAGNOSIS — R53.83 OTHER FATIGUE: ICD-10-CM

## 2025-06-17 DIAGNOSIS — E28.2 PCOS (POLYCYSTIC OVARIAN SYNDROME): ICD-10-CM

## 2025-06-17 DIAGNOSIS — Z11.59 NEED FOR HEPATITIS C SCREENING TEST: ICD-10-CM

## 2025-06-17 DIAGNOSIS — Z13.220 SCREENING CHOLESTEROL LEVEL: ICD-10-CM

## 2025-06-17 LAB
25(OH)D3 SERPL-MCNC: 22.7 NG/ML (ref 30–100)
ALBUMIN SERPL BCG-MCNC: 4.2 G/DL (ref 3.5–5)
ALP SERPL-CCNC: 82 U/L (ref 34–104)
ALT SERPL W P-5'-P-CCNC: 14 U/L (ref 7–52)
ANION GAP SERPL CALCULATED.3IONS-SCNC: 8 MMOL/L (ref 4–13)
AST SERPL W P-5'-P-CCNC: 13 U/L (ref 13–39)
BASOPHILS # BLD AUTO: 0.05 THOUSANDS/ÂΜL (ref 0–0.1)
BASOPHILS NFR BLD AUTO: 1 % (ref 0–1)
BILIRUB SERPL-MCNC: 0.51 MG/DL (ref 0.2–1)
BUN SERPL-MCNC: 9 MG/DL (ref 5–25)
CALCIUM SERPL-MCNC: 8.9 MG/DL (ref 8.4–10.2)
CHLORIDE SERPL-SCNC: 100 MMOL/L (ref 96–108)
CHOLEST SERPL-MCNC: 186 MG/DL (ref ?–200)
CO2 SERPL-SCNC: 26 MMOL/L (ref 21–32)
CREAT SERPL-MCNC: 0.82 MG/DL (ref 0.6–1.3)
EOSINOPHIL # BLD AUTO: 0.33 THOUSAND/ÂΜL (ref 0–0.61)
EOSINOPHIL NFR BLD AUTO: 3 % (ref 0–6)
ERYTHROCYTE [DISTWIDTH] IN BLOOD BY AUTOMATED COUNT: 13.4 % (ref 11.6–15.1)
EST. AVERAGE GLUCOSE BLD GHB EST-MCNC: 94 MG/DL
FSH SERPL-ACNC: 1.3 MIU/ML
GFR SERPL CREATININE-BSD FRML MDRD: 97 ML/MIN/1.73SQ M
GLUCOSE P FAST SERPL-MCNC: 83 MG/DL (ref 65–99)
HBA1C MFR BLD: 4.9 %
HCT VFR BLD AUTO: 42.2 % (ref 34.8–46.1)
HDLC SERPL-MCNC: 52 MG/DL
HGB BLD-MCNC: 14.3 G/DL (ref 11.5–15.4)
IMM GRANULOCYTES # BLD AUTO: 0.03 THOUSAND/UL (ref 0–0.2)
IMM GRANULOCYTES NFR BLD AUTO: 0 % (ref 0–2)
INSULIN SERPL-ACNC: 18.51 UIU/ML (ref 1.9–23)
LDLC SERPL CALC-MCNC: 107 MG/DL (ref 0–100)
LH SERPL-ACNC: 2.1 MIU/ML
LYMPHOCYTES # BLD AUTO: 2.42 THOUSANDS/ÂΜL (ref 0.6–4.47)
LYMPHOCYTES NFR BLD AUTO: 25 % (ref 14–44)
MCH RBC QN AUTO: 30.4 PG (ref 26.8–34.3)
MCHC RBC AUTO-ENTMCNC: 33.9 G/DL (ref 31.4–37.4)
MCV RBC AUTO: 90 FL (ref 82–98)
MONOCYTES # BLD AUTO: 0.5 THOUSAND/ÂΜL (ref 0.17–1.22)
MONOCYTES NFR BLD AUTO: 5 % (ref 4–12)
NEUTROPHILS # BLD AUTO: 6.36 THOUSANDS/ÂΜL (ref 1.85–7.62)
NEUTS SEG NFR BLD AUTO: 66 % (ref 43–75)
NONHDLC SERPL-MCNC: 134 MG/DL
NRBC BLD AUTO-RTO: 0 /100 WBCS
PLATELET # BLD AUTO: 393 THOUSANDS/UL (ref 149–390)
PMV BLD AUTO: 9 FL (ref 8.9–12.7)
POTASSIUM SERPL-SCNC: 3.8 MMOL/L (ref 3.5–5.3)
PROT SERPL-MCNC: 7.3 G/DL (ref 6.4–8.4)
RBC # BLD AUTO: 4.71 MILLION/UL (ref 3.81–5.12)
SODIUM SERPL-SCNC: 134 MMOL/L (ref 135–147)
TRIGL SERPL-MCNC: 133 MG/DL (ref ?–150)
WBC # BLD AUTO: 9.69 THOUSAND/UL (ref 4.31–10.16)

## 2025-06-17 PROCEDURE — 83525 ASSAY OF INSULIN: CPT

## 2025-06-17 PROCEDURE — 83002 ASSAY OF GONADOTROPIN (LH): CPT

## 2025-06-17 PROCEDURE — 83036 HEMOGLOBIN GLYCOSYLATED A1C: CPT

## 2025-06-17 PROCEDURE — 85025 COMPLETE CBC W/AUTO DIFF WBC: CPT

## 2025-06-17 PROCEDURE — 86803 HEPATITIS C AB TEST: CPT

## 2025-06-17 PROCEDURE — 82306 VITAMIN D 25 HYDROXY: CPT

## 2025-06-17 PROCEDURE — 84443 ASSAY THYROID STIM HORMONE: CPT

## 2025-06-17 PROCEDURE — 84403 ASSAY OF TOTAL TESTOSTERONE: CPT

## 2025-06-17 PROCEDURE — 80061 LIPID PANEL: CPT

## 2025-06-17 PROCEDURE — 84402 ASSAY OF FREE TESTOSTERONE: CPT

## 2025-06-17 PROCEDURE — 87389 HIV-1 AG W/HIV-1&-2 AB AG IA: CPT

## 2025-06-17 PROCEDURE — 36415 COLL VENOUS BLD VENIPUNCTURE: CPT

## 2025-06-17 PROCEDURE — 83001 ASSAY OF GONADOTROPIN (FSH): CPT

## 2025-06-17 PROCEDURE — 80053 COMPREHEN METABOLIC PANEL: CPT

## 2025-06-18 ENCOUNTER — RESULTS FOLLOW-UP (OUTPATIENT)
Dept: FAMILY MEDICINE CLINIC | Facility: CLINIC | Age: 30
End: 2025-06-18

## 2025-06-18 LAB
HCV AB SER QL: NORMAL
HIV 1+2 AB+HIV1 P24 AG SERPL QL IA: NORMAL
TESTOST FREE SERPL-MCNC: 1.1 PG/ML (ref 0–4.2)
TESTOST SERPL-MCNC: 20 NG/DL (ref 13–71)
TSH SERPL DL<=0.05 MIU/L-ACNC: 2.08 UIU/ML (ref 0.45–4.5)

## 2025-06-19 NOTE — TELEPHONE ENCOUNTER
----- Message from JOMAR Moreno sent at 6/19/2025 11:08 AM EDT -----  Vit d is slightly low- recommend vit d3 2,000 units daily. LDL near optimal- monitor diet to reduce high fat foods, increase lean proteins, fruits veggies and get 150 minutes of exercise per week.   ----- Message -----  From: Lab, Background User  Sent: 6/17/2025   5:45 PM EDT  To: JOMAR Butler

## 2025-07-14 ENCOUNTER — TELEPHONE (OUTPATIENT)
Age: 30
End: 2025-07-14

## 2025-07-14 NOTE — TELEPHONE ENCOUNTER
Patient called and wanted un update for information below. She was advised office must resubmit claim. She would like a call back at [phone]  Thank you     Sarah Mccormick MA to Riya Hood JR      7/11/25  6:29 AM  Landon Ritter     I can not see if billing re-submitted this you would have to call billing they would be the one's who resubmit billing I would call the billing number on your bill they would be able to help you further.     Please let me know if you need anything further     Thank you   Sarah Milner read by Riya Hood at 10:33AM on 7/14/2025.

## 2025-07-16 ENCOUNTER — TELEPHONE (OUTPATIENT)
Dept: NEUROLOGY | Facility: CLINIC | Age: 30
End: 2025-07-16

## 2025-07-16 NOTE — TELEPHONE ENCOUNTER
Spoke to pt to r/s 9/5 appt due to [facility]. Pt scheduled with Yeyo HADLEY 9/10 at 12pm. TERA request sent to provider.

## 2025-07-21 NOTE — TELEPHONE ENCOUNTER
Spoke to billing and you have to order new orders with updated codes signed and dated and then it needs to be emailed to [email or web address] but per billing this may be denied as patient has a $1000. Deductible and the test in question are diag. Test

## 2025-07-22 ENCOUNTER — ANNUAL EXAM (OUTPATIENT)
Dept: GYNECOLOGY | Facility: CLINIC | Age: 30
End: 2025-07-22
Payer: COMMERCIAL

## 2025-07-22 ENCOUNTER — TELEPHONE (OUTPATIENT)
Dept: FAMILY MEDICINE CLINIC | Facility: CLINIC | Age: 30
End: 2025-07-22

## 2025-07-22 VITALS
BODY MASS INDEX: 30.82 KG/M2 | HEIGHT: 66 IN | WEIGHT: 191.8 LBS | DIASTOLIC BLOOD PRESSURE: 78 MMHG | HEART RATE: 101 BPM | SYSTOLIC BLOOD PRESSURE: 120 MMHG

## 2025-07-22 DIAGNOSIS — Z01.411 ENCOUNTER FOR GYNECOLOGICAL EXAMINATION (GENERAL) (ROUTINE) WITH ABNORMAL FINDINGS: Primary | ICD-10-CM

## 2025-07-22 DIAGNOSIS — E28.2 POLYCYSTIC OVARY SYNDROME: ICD-10-CM

## 2025-07-22 DIAGNOSIS — Z12.4 ENCOUNTER FOR PAPANICOLAOU SMEAR FOR CERVICAL CANCER SCREENING: ICD-10-CM

## 2025-07-22 PROCEDURE — S0610 ANNUAL GYNECOLOGICAL EXAMINA: HCPCS | Performed by: OBSTETRICS & GYNECOLOGY

## 2025-07-22 PROCEDURE — G0145 SCR C/V CYTO,THINLAYER,RESCR: HCPCS | Performed by: OBSTETRICS & GYNECOLOGY

## 2025-07-22 RX ORDER — NORGESTREL AND ETHINYL ESTRADIOL 0.3-0.03MG
1 KIT ORAL DAILY
Qty: 84 TABLET | Refills: 3 | Status: SHIPPED | OUTPATIENT
Start: 2025-07-22 | End: 2025-07-25 | Stop reason: SDUPTHER

## 2025-07-22 NOTE — PROGRESS NOTES
Name: Riya Hood      : 1995      MRN: [id number]  Encounter Provider: JOMAR Charles  Encounter Date: 2025   Encounter department: [facility]  :  Assessment & Plan  Encounter for gynecological examination (general) (routine) with abnormal findings  The patient likes current contraceptive method and desires to continue, OCP. Recommended monthly SBE and annual CBE. ASCCP guidelines reviewed and pap collected today. The patient declines STI testing at this time. Reviewed diet/activity recommendations. Return to the office in one year for routine annual gyn exam or sooner PRN.         Polycystic ovary syndrome  Will continue OCP  Orders:    Low-Ogestrel 0.3-30 MG-MCG per tablet; Take 1 tablet by mouth daily    Encounter for Papanicolaou smear for cervical cancer screening    Orders:    Liquid-based pap, screening        History of Present Illness   This new patient presents for routine annual gyn exam.   Menses are light and regular on OCP.  She reports a hx of PCOS. They do wish to conceive after they are .  She denies breast concerns, abn discharge, pelvic pain, bowel/bladder dysfunction, depression/anxiety.   Monogamous relationship, getting  2026. Denies STI concerns.   Likes current contraception and desires to continue.   Last pap at [facility] 23.  Gardasil completed per pt        Review of Systems   Constitutional: Negative.    HENT: Negative.     Respiratory: Negative.     Cardiovascular: Negative.    Gastrointestinal: Negative.    Endocrine: Negative.    Genitourinary:  Negative for difficulty urinating, dyspareunia, dysuria, frequency, menstrual problem, pelvic pain, urgency, vaginal bleeding, vaginal discharge and vaginal pain.   Musculoskeletal: Negative.    Skin: Negative.    Neurological: Negative.    Psychiatric/Behavioral: Negative.            Objective   /78 (BP Location: Left arm, Patient Position: Sitting, Cuff  "Size: Large)   Pulse 101   Ht 5' 6\" (1.676 m)   Wt 87 kg (191 lb 12.8 oz)   LMP 06/25/2025   BMI 30.96 kg/m²      Physical Exam  Vitals and nursing note reviewed. Exam conducted with a chaperone present.   Constitutional:       General: She is not in acute distress.     Appearance: She is well-developed.   HENT:      Head: Normocephalic and atraumatic.     Eyes:      Conjunctiva/sclera: Conjunctivae normal.       Cardiovascular:      Rate and Rhythm: Normal rate and regular rhythm.      Heart sounds: No murmur heard.  Pulmonary:      Effort: Pulmonary effort is normal. No respiratory distress.      Breath sounds: Normal breath sounds.   Chest:   Breasts:     Right: No swelling, bleeding, inverted nipple, mass, nipple discharge, skin change or tenderness.      Left: No swelling, bleeding, inverted nipple, mass, nipple discharge, skin change or tenderness.   Abdominal:      Palpations: Abdomen is soft.      Tenderness: There is no abdominal tenderness.   Genitourinary:     General: Normal vulva.      Exam position: Supine.      Pubic Area: No rash.       Labia:         Right: No rash, tenderness, lesion or injury.         Left: No rash, tenderness, lesion or injury.       Urethra: No urethral pain, urethral swelling or urethral lesion.      Vagina: No signs of injury and foreign body. No vaginal discharge, erythema, tenderness, bleeding or lesions.      Cervix: No cervical motion tenderness, discharge, friability, lesion, erythema, cervical bleeding or eversion.      Uterus: Not deviated, not enlarged, not fixed and not tender.       Adnexa:         Right: No mass, tenderness or fullness.          Left: No mass, tenderness or fullness.       Musculoskeletal:         General: No swelling.      Cervical back: Neck supple.     Skin:     General: Skin is warm and dry.     Neurological:      Mental Status: She is alert.     Psychiatric:         Mood and Affect: Mood normal.           "

## 2025-07-22 NOTE — TELEPHONE ENCOUNTER
Patient stopped in office with significant other concerned they have not heard anything regarding bloodwork and having coding changed.  They stated many messages have been going back and forth and they were upset they were getting the run around regarding this.  I informed the patient to please get a copy of the routine tests that her insurance covers without deductible.  She did state she came in for fatigue.  Bloodwork was ordered but I do not have access to know what was done.  The billing department stated they needed to call us and they were told by us that billing must handle this.  Will speak with my manager when she gets back into the office.

## 2025-07-25 DIAGNOSIS — E28.2 POLYCYSTIC OVARY SYNDROME: ICD-10-CM

## 2025-07-25 LAB
LAB AP GYN PRIMARY INTERPRETATION: NORMAL
Lab: NORMAL

## 2025-07-25 RX ORDER — NORGESTREL AND ETHINYL ESTRADIOL 0.3-0.03MG
1 KIT ORAL DAILY
Qty: 84 TABLET | Refills: 3 | Status: SHIPPED | OUTPATIENT
Start: 2025-07-25

## 2025-07-29 DIAGNOSIS — E28.2 POLYCYSTIC OVARY SYNDROME: ICD-10-CM

## 2025-07-30 ENCOUNTER — OFFICE VISIT (OUTPATIENT)
Age: 30
End: 2025-07-30

## 2025-07-30 DIAGNOSIS — G47.9 SLEEP DIFFICULTIES: ICD-10-CM

## 2025-07-30 DIAGNOSIS — F41.0 PANIC DISORDER: ICD-10-CM

## 2025-07-30 DIAGNOSIS — F40.298: ICD-10-CM

## 2025-07-30 DIAGNOSIS — F41.1 GENERALIZED ANXIETY DISORDER: ICD-10-CM

## 2025-07-30 DIAGNOSIS — F33.1 MAJOR DEPRESSIVE DISORDER, RECURRENT, MODERATE (HCC): Primary | ICD-10-CM

## 2025-07-30 DIAGNOSIS — R45.86 MOOD SWINGS: ICD-10-CM

## 2025-07-30 PROBLEM — R23.8 DRY SCALP: Status: ACTIVE | Noted: 2023-05-26

## 2025-07-30 PROBLEM — G43.909 MIGRAINE: Status: ACTIVE | Noted: 2025-07-30

## 2025-07-30 PROCEDURE — PBNCHG PB NO CHARGE PLACEHOLDER: Performed by: PSYCHIATRY & NEUROLOGY

## 2025-07-30 RX ORDER — FLUOXETINE HYDROCHLORIDE 40 MG/1
40 CAPSULE ORAL DAILY
Qty: 90 CAPSULE | Refills: 5 | Status: SHIPPED | OUTPATIENT
Start: 2025-07-30

## 2025-07-30 RX ORDER — HYDROXYZINE HYDROCHLORIDE 10 MG/1
10 TABLET, FILM COATED ORAL EVERY 6 HOURS PRN
Qty: 90 TABLET | Refills: 5 | Status: SHIPPED | OUTPATIENT
Start: 2025-07-30

## 2025-07-30 RX ORDER — CHOLECALCIFEROL (VITAMIN D3) 50 MCG
2000 TABLET ORAL DAILY
COMMUNITY

## 2025-07-30 RX ORDER — NORGESTREL AND ETHINYL ESTRADIOL 0.3-0.03MG
KIT ORAL
Qty: 84 TABLET | Refills: 3 | Status: SHIPPED | OUTPATIENT
Start: 2025-07-30 | End: 2025-08-01 | Stop reason: ALTCHOICE

## 2025-07-30 RX ORDER — HYDROXYZINE HYDROCHLORIDE 25 MG/1
25 TABLET, FILM COATED ORAL EVERY 6 HOURS PRN
Qty: 120 TABLET | Refills: 5 | Status: SHIPPED | OUTPATIENT
Start: 2025-07-30

## 2025-07-30 RX ORDER — HYDROXYZINE HYDROCHLORIDE 10 MG/1
TABLET, FILM COATED ORAL
COMMUNITY
Start: 2025-07-16 | End: 2025-07-30 | Stop reason: SDUPTHER

## 2025-07-30 RX ORDER — LAMOTRIGINE 100 MG/1
100 TABLET ORAL DAILY
Qty: 90 TABLET | Refills: 5 | Status: SHIPPED | OUTPATIENT
Start: 2025-07-30

## 2025-08-01 DIAGNOSIS — Z30.41 ENCOUNTER FOR SURVEILLANCE OF CONTRACEPTIVE PILLS: Primary | ICD-10-CM

## 2025-08-01 RX ORDER — NORETHINDRONE ACETATE AND ETHINYL ESTRADIOL 1.5-30(21)
1 KIT ORAL DAILY
Qty: 90 TABLET | Refills: 3 | Status: SHIPPED | OUTPATIENT
Start: 2025-08-01

## 2025-08-18 ENCOUNTER — OFFICE VISIT (OUTPATIENT)
Dept: FAMILY MEDICINE CLINIC | Facility: CLINIC | Age: 30
End: 2025-08-18
Payer: COMMERCIAL

## 2025-08-18 VITALS
TEMPERATURE: 98.1 F | SYSTOLIC BLOOD PRESSURE: 132 MMHG | HEIGHT: 66 IN | HEART RATE: 68 BPM | BODY MASS INDEX: 30.86 KG/M2 | DIASTOLIC BLOOD PRESSURE: 80 MMHG | OXYGEN SATURATION: 97 % | WEIGHT: 192 LBS

## 2025-08-18 DIAGNOSIS — R10.11 RUQ PAIN: Primary | ICD-10-CM

## 2025-08-18 PROCEDURE — 99214 OFFICE O/P EST MOD 30 MIN: CPT | Performed by: NURSE PRACTITIONER
